# Patient Record
Sex: MALE | Race: OTHER | HISPANIC OR LATINO | Employment: UNEMPLOYED | URBAN - METROPOLITAN AREA
[De-identification: names, ages, dates, MRNs, and addresses within clinical notes are randomized per-mention and may not be internally consistent; named-entity substitution may affect disease eponyms.]

---

## 2020-02-20 PROBLEM — E66.811 CLASS 1 OBESITY DUE TO EXCESS CALORIES WITH SERIOUS COMORBIDITY AND BODY MASS INDEX (BMI) OF 32.0 TO 32.9 IN ADULT: Status: ACTIVE | Noted: 2020-02-20

## 2022-10-24 PROBLEM — E66.811 CLASS 1 OBESITY DUE TO EXCESS CALORIES WITH SERIOUS COMORBIDITY AND BODY MASS INDEX (BMI) OF 32.0 TO 32.9 IN ADULT: Status: RESOLVED | Noted: 2020-02-20 | Resolved: 2022-10-24

## 2023-10-03 ENCOUNTER — HOSPITAL ENCOUNTER (EMERGENCY)
Facility: HOSPITAL | Age: 43
Discharge: HOME | End: 2023-10-03
Attending: EMERGENCY MEDICINE
Payer: MEDICARE

## 2023-10-03 VITALS
TEMPERATURE: 97.5 F | RESPIRATION RATE: 18 BRPM | WEIGHT: 175.5 LBS | OXYGEN SATURATION: 96 % | BODY MASS INDEX: 29.2 KG/M2 | SYSTOLIC BLOOD PRESSURE: 128 MMHG | DIASTOLIC BLOOD PRESSURE: 76 MMHG | HEART RATE: 72 BPM

## 2023-10-03 DIAGNOSIS — S13.9XXA CERVICAL SPRAIN, INITIAL ENCOUNTER: Primary | ICD-10-CM

## 2023-10-03 PROCEDURE — 99284 EMERGENCY DEPT VISIT MOD MDM: CPT | Performed by: EMERGENCY MEDICINE

## 2023-10-03 PROCEDURE — 96372 THER/PROPH/DIAG INJ SC/IM: CPT

## 2023-10-03 PROCEDURE — 99282 EMERGENCY DEPT VISIT SF MDM: CPT

## 2023-10-03 RX ORDER — CYCLOBENZAPRINE HCL 10 MG
10 TABLET ORAL ONCE
Status: COMPLETED | OUTPATIENT
Start: 2023-10-03 | End: 2023-10-03

## 2023-10-03 RX ORDER — IBUPROFEN 400 MG/1
400 TABLET ORAL EVERY 6 HOURS PRN
Qty: 30 TABLET | Refills: 0 | Status: SHIPPED | OUTPATIENT
Start: 2023-10-03 | End: 2023-10-10

## 2023-10-03 RX ORDER — KETOROLAC TROMETHAMINE 30 MG/ML
15 INJECTION, SOLUTION INTRAMUSCULAR; INTRAVENOUS ONCE
Status: COMPLETED | OUTPATIENT
Start: 2023-10-03 | End: 2023-10-03

## 2023-10-03 RX ORDER — CYCLOBENZAPRINE HCL 10 MG
10 TABLET ORAL 2 TIMES DAILY PRN
Qty: 20 TABLET | Refills: 0 | Status: SHIPPED | OUTPATIENT
Start: 2023-10-03

## 2023-10-03 RX ADMIN — CYCLOBENZAPRINE HYDROCHLORIDE 10 MG: 10 TABLET, FILM COATED ORAL at 11:44

## 2023-10-03 RX ADMIN — KETOROLAC TROMETHAMINE 15 MG: 30 INJECTION, SOLUTION INTRAMUSCULAR; INTRAVENOUS at 11:44

## 2023-10-03 NOTE — ED PROVIDER NOTES
History  Chief Complaint   Patient presents with   • Shoulder Pain     Since yesterday, after going to the gym. Left side shoulder travels to neck     33-year-old male presenting the emergency department with left neck and shoulder pain. Notes that this started yesterday afternoon. Did go to the gym in the morning but was fine after the gym. No other trauma. No nausea vomiting diarrhea. No shortness of breath and no fever chills cough congestion rhinorrhea. Prior to Admission Medications   Prescriptions Last Dose Informant Patient Reported? Taking?    Blood Pressure Monitoring (BLOOD PRESSURE CUFF) MISC  Self No No   Sig: by Does not apply route daily   DULoxetine (CYMBALTA) 20 mg capsule  Self Yes No   Sig: Take 20 mg by mouth daily     E-Z JECT LANCETS MISC  Self Yes No   Sig: to use twice a day, fasting and 2 hrs after diner   Empagliflozin (Jardiance) 10 MG TABS tablet   No No   Sig: Take 1 tablet (10 mg total) by mouth every morning   atorvastatin (LIPITOR) 40 mg tablet  Self No No   Sig: Take 1 tablet (40 mg total) by mouth daily   gabapentin (NEURONTIN) 100 mg capsule  Self Yes No   Sig: Take 1 capsule by mouth 2 (two) times a day   glucose blood test strip  Self No No   Sig: Testing sugars twice a day   glucose monitoring kit (FREESTYLE) monitoring kit  Self Yes No   hydrOXYzine pamoate (VISTARIL) 25 mg capsule  Self Yes No   Sig: Take 25 mg by mouth in the morning     metFORMIN (GLUCOPHAGE) 500 mg tablet   No No   Sig: TAKE 1 TABLET BY MOUTH TWO (TWO) TIMES A DAY WITH MEALS   metoprolol succinate (TOPROL-XL) 25 mg 24 hr tablet   No No   Sig: Take 0.5 tablets (12.5 mg total) by mouth 2 (two) times a day   omega-3-acid ethyl esters (LOVAZA) 1 g capsule  Self No No   Sig: Take 2 capsules (2 g total) by mouth 2 (two) times a day   sacubitril-valsartan (Entresto) 24-26 MG TABS   No No   Sig: Take 1 tablet by mouth 2 (two) times a day   tamsulosin (FLOMAX) 0.4 mg  Self Yes No   Sig: Take 0.4 mg by mouth in the morning   triamcinolone (KENALOG) 0.5 % ointment  Self No No   Sig: Apply topically 2 (two) times a day   Patient not taking: Reported on 3/2/2022      Facility-Administered Medications: None       Past Medical History:   Diagnosis Date   • Anxiety    • Depression    • Diabetes mellitus ([address])     Type 2   • Hypertension        Past Surgical History:   Procedure Laterality Date   • NE CYSTOURETHROSCOPY N/A 6/10/2016    Procedure: Melissa Echeverria ;  Surgeon: Mere Mejía MD;  Location: [facility];  Service: Urology       Family History   Problem Relation Age of Onset   • Diabetes Father    • Hypertension Father    • Mental illness Father    • Diabetes Maternal Grandmother    • Hypertension Mother    • Heart failure Mother    • Coronary artery disease Mother    • Mental illness Mother    • Heart failure Brother    • Coronary artery disease Brother    • Hypertension Brother    • Diabetes Family      I have reviewed and agree with the history as documented. E-Cigarette/Vaping     E-Cigarette/Vaping Substances     Social History     Tobacco Use   • Smoking status: Never   • Smokeless tobacco: Never   Substance Use Topics   • Alcohol use: No   • Drug use: No       Review of Systems   Constitutional: Negative for chills and fever. HENT: Negative for ear pain and sore throat. Eyes: Negative for pain and visual disturbance. Respiratory: Negative for cough and shortness of breath. Cardiovascular: Negative for chest pain and palpitations. Gastrointestinal: Negative for abdominal pain and vomiting. Genitourinary: Negative for dysuria and hematuria. Musculoskeletal: Positive for arthralgias and neck pain. Negative for back pain. Skin: Negative for color change and rash. Neurological: Negative for seizures and syncope. All other systems reviewed and are negative. Physical Exam  Physical Exam  Vitals and nursing note reviewed.    Constitutional:       General: He is not in acute distress. Appearance: He is well-developed. HENT:      Head: Normocephalic and atraumatic. Eyes:      Conjunctiva/sclera: Conjunctivae normal.   Cardiovascular:      Rate and Rhythm: Normal rate and regular rhythm. Heart sounds: No murmur heard. Pulmonary:      Effort: Pulmonary effort is normal. No respiratory distress. Breath sounds: Normal breath sounds. Abdominal:      Palpations: Abdomen is soft. Tenderness: There is no abdominal tenderness. Musculoskeletal:         General: No swelling. Cervical back: Neck supple. Comments: No midline C-spine tenderness palpation. Left trapezius muscle ttp   Skin:     General: Skin is warm and dry. Capillary Refill: Capillary refill takes less than 2 seconds. Neurological:      Mental Status: He is alert. Psychiatric:         Mood and Affect: Mood normal.         Vital Signs  ED Triage Vitals [10/03/23 1130]   Temperature Pulse Respirations Blood Pressure SpO2   97.5 °F (36.4 °C) 72 18 128/76 96 %      Temp src Heart Rate Source Patient Position - Orthostatic VS BP Location FiO2 (%)   -- -- -- -- --      Pain Score       10 - Worst Possible Pain           Vitals:    10/03/23 1130   BP: 128/76   Pulse: 72         Visual Acuity      ED Medications  Medications   ketorolac (TORADOL) injection 15 mg (has no administration in time range)   cyclobenzaprine (FLEXERIL) tablet 10 mg (has no administration in time range)       Diagnostic Studies  Results Reviewed     None                 No orders to display              Procedures  Procedures         ED Course                               SBIRT 20yo+    Flowsheet Row Most Recent Value   Initial Alcohol Screen: US AUDIT-C     1. How often do you have a drink containing alcohol? 0 Filed at: 10/03/2023 1132   2. How many drinks containing alcohol do you have on a typical day you are drinking? 0 Filed at: 10/03/2023 1132   3a. Male UNDER 65:  How often do you have five or more drinks on one occasion? 0 Filed at: 10/03/2023 1132   3b. FEMALE Any Age, or MALE 65+: How often do you have 4 or more drinks on one occassion? 0 Filed at: 10/03/2023 1132   Audit-C Score 0 Filed at: 10/03/2023 1132   ELENO: How many times in the past year have you. .. Used an illegal drug or used a prescription medication for non-medical reasons? Never Filed at: 10/03/2023 1132                    Medical Decision Making  47-year-old male presenting to the emergency department with left neck pain. No bony tenderness to palpation. Started after exercise, likely cervical/trapezius sprain. Muscle relaxant and NSAIDs. PCP follow-up. Risk  Prescription drug management. Disposition  Final diagnoses:   Cervical sprain, initial encounter     Time reflects when diagnosis was documented in both MDM as applicable and the Disposition within this note     Time User Action Codes Description Comment    10/3/2023 11:38 AM Tejal Lee [S13. 9XXA] Cervical sprain, initial encounter       ED Disposition     ED Disposition   Discharge    Condition   Stable    Date/Time   Tue Oct 3, 2023 11:38 AM    Comment   Wes Virgen discharge to home/self care. Follow-up Information     Follow up With Specialties Details Why [facility] [address], MD Family Medicine   [address] [place] [address]  [phone]            Patient's Medications   Discharge Prescriptions    CYCLOBENZAPRINE (FLEXERIL) 10 MG TABLET    Take 1 tablet (10 mg total) by mouth 2 (two) times a day as needed for muscle spasms       Start Date: 10/3/2023 End Date: --       Order Dose: 10 mg       Quantity: 20 tablet    Refills: 0    IBUPROFEN (MOTRIN) 400 MG TABLET    Take 1 tablet (400 mg total) by mouth every 6 (six) hours as needed for mild pain (Body aches or fever) for up to 7 days       Start Date: 10/3/2023 End Date: 10/10/2023       Order Dose: 400 mg       Quantity: 30 tablet    Refills: 0 No discharge procedures on file.     PDMP Review     None          ED Provider  Electronically Signed by           Adiel Ignacio MD  10/03/23 3711 Never smoker

## 2023-10-06 DIAGNOSIS — I42.9 CARDIOMYOPATHY, UNSPECIFIED TYPE (HCC): ICD-10-CM

## 2023-10-06 RX ORDER — METOPROLOL SUCCINATE 25 MG/1
12.5 TABLET, EXTENDED RELEASE ORAL 2 TIMES DAILY
Qty: 45 TABLET | Refills: 1 | Status: SHIPPED | OUTPATIENT
Start: 2023-10-06

## 2023-10-31 ENCOUNTER — OFFICE VISIT (OUTPATIENT)
Dept: CARDIOLOGY CLINIC | Facility: CLINIC | Age: 43
End: 2023-10-31
Payer: MEDICARE

## 2023-10-31 VITALS
DIASTOLIC BLOOD PRESSURE: 80 MMHG | OXYGEN SATURATION: 98 % | HEART RATE: 78 BPM | BODY MASS INDEX: 27.99 KG/M2 | WEIGHT: 168 LBS | SYSTOLIC BLOOD PRESSURE: 120 MMHG | HEIGHT: 65 IN

## 2023-10-31 DIAGNOSIS — I50.9 CHRONIC CONGESTIVE HEART FAILURE, UNSPECIFIED HEART FAILURE TYPE (HCC): Primary | ICD-10-CM

## 2023-10-31 DIAGNOSIS — E78.2 MIXED HYPERLIPIDEMIA: ICD-10-CM

## 2023-10-31 DIAGNOSIS — Z86.79 H/O CARDIOMYOPATHY: ICD-10-CM

## 2023-10-31 DIAGNOSIS — I10 BENIGN ESSENTIAL HYPERTENSION: ICD-10-CM

## 2023-10-31 PROCEDURE — 99214 OFFICE O/P EST MOD 30 MIN: CPT | Performed by: INTERNAL MEDICINE

## 2023-10-31 NOTE — PROGRESS NOTES
Advanced Heart Failure Outpatient Progress Note - Elmyra Epley 37 y.o. male MRN: [id number]    Encounter: [id number]      Assessment/Plan:    Patient Active Problem List    Diagnosis Date Noted    Dry skin dermatitis 02/08/2021    Nephrolithiasis 10/06/2020    Mixed hyperlipidemia 07/24/2020    Microalbuminuria 07/24/2020    Bipolar affective disorder in remission ([address]) 08/16/2018    Vitamin D deficiency 07/19/2018    Benign non-nodular prostatic hyperplasia with lower urinary tract symptoms 05/03/2016    Arthritis 11/06/2014    Congestive heart failure ([address]) 11/06/2014    Depression 11/06/2014    Anxiety 11/06/2014    Sleep apnea 11/06/2014    Scoliosis (and kyphoscoliosis), idiopathic 10/02/2014    Large liver 10/02/2014    Cardiomyopathy ([address]) 05/08/2013    Benign essential hypertension 07/15/2012    Controlled diabetes mellitus type 2 with complications, unspecified whether long term insulin use ([address]) 07/15/2012       # Heart failure with improved EF, NYHA I  Etiology: Nonischemic. H/o palpitations. Mother with history of "enlarged heart" so possible familial. Has hypertension but has not been not severely elevated. Patient deferred cardiac MRI. 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 Predominant underlying rhythm was Sinus Rhythm. Isolated SVEs  were rare (<1.0%), and no SVE Couplets or SVE Triplets were present.   Isolated VEs were rare (<1.0%), and no VE Couplets or VE Triplets were  Present. Pharm Nuc Stress 12/2/21: No perfusion defects. LVEF 49%    Echocardiogram 9/7/2021  LVEF: 40%  LVIDd: 4.9cm  RV: normal size and systolic function  MR: trace  PASP: inadequate TR envelope for estimation  RVOT: no notching  Other: normal diastology, concentric LVH (IVSd 1.2 PWd 1.4)    Pharm Nuc Stress 5/13/2013: No perfusion defects. LVEF 51%  TTE 9/5/2012: LVEF 35%. Grade 2 diastology    Diet:  2 g sodium diet  2000 mL fluid restriction    Neurohormonal Blockade:  --Beta-Blocker: metoprolol succinate 12.5mg BID  --ACEi, ARB or ARNi: entresto 24-26mg BID  --Aldosterone Receptor Blocker: none  --SGLT2 Inhibitor: empagliflozin 10mg daily  --Diuretic: none    Sudden Cardiac Death Risk Reduction:  --ICD:  LVEF >35%    Electrical Resynchronization:  --Candidacy for BiV device: narrow QRS    Advanced Therapies (if appropriate): --We will continue to monitor    # DM type 2  HbA1c 5.7 4/17/23, per PCP  # SKYE, used in 2012, was 262 lbs then, no obstructive sleep apnea on sleep study 4/22/23  # Hypertension, controlled  # Mixed hyperlipidemia  8/3/21: TG 86 LDL86  1/25/22: TG 98 HDL 35   4/17/23: TG 90 HDL 46   Rx: atorvastatin 40mg daily,  lovaza 2g BID    Today's Plan:  Continues to do well from a cardiac standpoint  Continue current medications  2 g sodium diet  Physical activity/exercise as tolerated  Follow-up in 6 months or sooner as needed    HPI:   37 y.o.  with PMH as above who is for follow up.    4/26/22: Here for follow up. Overall doing fine. He continues to work out but would have intermittent episodes of shortness of breath "depending on the day", seems to be overall on the same amount of exertion. No PND or orthopnea. No leg swelling. No palpitations or chest pain. No lightheadedness. History obtained with ALTHEA Darby for translation    Per NP Miguelina: 6/23/22. Presents for follow up. [place], [place], providing assistance with translation.  At last visit, was started on Jardiance 10 mg daily. Follow up BMP stable. In the ED on 6/17 with SOB. Workup unremarkable. Feels well today but still getting occasional palpitations when at rest. He continues to go to the gym. Does weight training and cardio, which includes playing basketball. He occasionally has to stop to take a break, but overall tolerates well. 9/19/22: Here for follow up. BMP 6/17 unremarkable. Reports doing well. Walks stairs no problems. Still going to the gym and playing basketball. No issues with medications. Occasional lightheadedness with getting up.     12/14/22: Here for follow up. Seen at the ED for fever up to 103 on 11/17/22. Tested positive for COVID 19. No active cardiac issues. Goes to the gym 5 days a week 1.5-2 hours. Doing weights and cardio. Sleeps for about 7 hours but still feeling tired upon waking up    Interval History:  5/4/23: here for follow up. 4/17/23 Na 137 K 4.5 and creatinine 0.85  The patient has no current concerns and is consistently taking his prescribed medications. He maintains a gym routine, working out 5 days a week for 1 to 2 hours, including 1 hour of cardio exercise, with no reported issues. He monitors his blood pressure at home, and the average reading ranges from 120/75 mmHg to 120/80 mmHg. His glucose levels are well-maintained. He went to the emergency room due to a neck sprain, which he had incurred by stretching his trapezius muscle at home. There are no problems related to fluid retention, swelling, or bloating. He denies experiencing any difficulty in breathing when lying down. He denies dizziness, lightheadedness, or palpitations. Past Medical History:   Diagnosis Date    Anxiety     Depression     Diabetes mellitus ([address])     Type 2    Hypertension        Review of Systems   Constitutional:  Negative for chills, fatigue and fever. HENT:  Negative for ear pain and sore throat. Eyes:  Negative for pain and visual disturbance. Respiratory:  Negative for cough and shortness of breath. Cardiovascular:  Negative for chest pain, palpitations and leg swelling. Gastrointestinal:  Negative for abdominal distention, abdominal pain and vomiting. Genitourinary:  Negative for dysuria and hematuria. Musculoskeletal:  Negative for arthralgias and back pain. Skin:  Negative for color change and rash. Neurological:  Negative for seizures, syncope and light-headedness. All other systems reviewed and are negative. Allergies   Allergen Reactions    No Known Allergies      .     Current Outpatient Medications:     atorvastatin (LIPITOR) 40 mg tablet, Take 1 tablet (40 mg total) by mouth daily, Disp: 90 tablet, Rfl: 1    Blood Pressure Monitoring (BLOOD PRESSURE CUFF) MISC, by Does not apply route daily, Disp: 1 each, Rfl: 0    cyclobenzaprine (FLEXERIL) 10 mg tablet, Take 1 tablet (10 mg total) by mouth 2 (two) times a day as needed for muscle spasms, Disp: 20 tablet, Rfl: 0    DULoxetine (CYMBALTA) 20 mg capsule, Take 20 mg by mouth daily  , Disp: , Rfl:     E-Z JECT LANCETS MISC, to use twice a day, fasting and 2 hrs after diner, Disp: , Rfl:     Empagliflozin (Jardiance) 10 MG TABS tablet, Take 1 tablet (10 mg total) by mouth every morning, Disp: 90 tablet, Rfl: 2    gabapentin (NEURONTIN) 100 mg capsule, Take 1 capsule by mouth 2 (two) times a day, Disp: , Rfl:     glucose blood test strip, Testing sugars twice a day, Disp: 200 each, Rfl: 1    glucose monitoring kit (FREESTYLE) monitoring kit, , Disp: , Rfl:     hydrOXYzine pamoate (VISTARIL) 25 mg capsule, Take 25 mg by mouth in the morning  , Disp: , Rfl:     ibuprofen (MOTRIN) 400 mg tablet, Take 1 tablet (400 mg total) by mouth every 6 (six) hours as needed for mild pain (Body aches or fever) for up to 7 days, Disp: 30 tablet, Rfl: 0    metFORMIN (GLUCOPHAGE) 500 mg tablet, TAKE 1 TABLET BY MOUTH TWO (TWO) TIMES A DAY WITH MEALS, Disp: 180 tablet, Rfl: 0    metoprolol succinate (TOPROL-XL) 25 mg 24 hr tablet, TAKE 0.5 TABLETS (12.5 MG TOTAL) BY MOUTH 2 (TWO) TIMES A DAY, Disp: 45 tablet, Rfl: 1    omega-3-acid ethyl esters (LOVAZA) 1 g capsule, Take 2 capsules (2 g total) by mouth 2 (two) times a day, Disp: 360 capsule, Rfl: 5    sacubitril-valsartan (Entresto) 24-26 MG TABS, Take 1 tablet by mouth 2 (two) times a day, Disp: 180 tablet, Rfl: 2    tamsulosin (FLOMAX) 0.4 mg, Take 0.4 mg by mouth in the morning (Patient not taking: Reported on 10/31/2023), Disp: , Rfl:     triamcinolone (KENALOG) 0.5 % ointment, Apply topically 2 (two) times a day (Patient not taking: Reported on 3/2/2022), Disp: 30 g, Rfl: 0    Social History     Socioeconomic History    Marital status: /Civil Union     Spouse name: Not on file    Number of children: Not on file    Years of education: Not on file    Highest education level: Not on file   Occupational History    Not on file   Tobacco Use    Smoking status: Never    Smokeless tobacco: Never   Substance and Sexual Activity    Alcohol use: No    Drug use: No    Sexual activity: Not on file   Other Topics Concern    Not on file   Social History Narrative    Non -Smoker     No known smoke exposure     No alcohol     No illicit drug use     Seatbelt use     Exercise regularly     Emmanuel      Social Determinants of Health     Financial Resource Strain: Low Risk  (10/24/2022)    Overall Financial Resource Strain (CARDIA)     Difficulty of Paying Living Expenses: Not hard at all   Food Insecurity: No Food Insecurity (2/20/2020)    Hunger Vital Sign     Worried About Running Out of Food in the Last Year: Never true     Ran Out of Food in the Last Year: Never true   Transportation Needs: No Transportation Needs (10/24/2022)    PRAPARE - Transportation     Lack of Transportation (Medical): No     Lack of Transportation (Non-Medical):  No   Physical Activity: Sufficiently Active (2/20/2020)    Exercise Vital Sign     Days of Exercise per Week: 3 days     Minutes of Exercise per Session: 60 min   Stress: No Stress Concern Present (2/20/2020)    [facility]     Feeling of Stress : Not at all   Social Connections: Unknown (2/20/2020)    Social Connection and Isolation Panel [NHANES]     Frequency of Communication with Friends and Family: Patient refused     Frequency of Social Gatherings with Friends and Family: Patient refused     Attends [employer] Services: Patient refused     Active Member of Clubs or Organizations: Patient refused     Attends Club or Organization Meetings: Patient refused     Marital Status: Patient refused   Intimate Partner Violence: Not At Risk (2/20/2020)    Humiliation, Afraid, Rape, and Kick questionnaire     Fear of Current or Ex-Partner: No     Emotionally Abused: No     Physically Abused: No     Sexually Abused: No   Housing Stability: Not on file       Family History   Problem Relation Age of Onset    Diabetes Father     Hypertension Father     Mental illness Father     Diabetes Maternal Grandmother     Hypertension Mother     Heart failure Mother     Coronary artery disease Mother     Mental illness Mother     Heart failure Brother     Coronary artery disease Brother     Hypertension Brother     Diabetes Family        Physical Exam:    Vitals: Blood pressure 120/80, pulse 78, height 5' 5" (1.651 m), weight 76.2 kg (168 lb), SpO2 98 %. , Body mass index is 27.96 kg/m².,   Wt Readings from Last 3 Encounters:   10/31/23 76.2 kg (168 lb)   10/03/23 79.6 kg (175 lb 8 oz)   07/26/23 77.5 kg (170 lb 12.8 oz)       Physical Exam  Constitutional:       General: He is not in acute distress. Appearance: Normal appearance. HENT:      Head: Normocephalic and atraumatic. Mouth/Throat:      Mouth: Mucous membranes are moist.   Eyes:      General: No scleral icterus. Extraocular Movements: Extraocular movements intact.    Cardiovascular:      Rate and Rhythm: Normal rate and regular rhythm. Pulses: Normal pulses. Heart sounds: S1 normal and S2 normal. No murmur heard. No friction rub. No gallop. Comments: Nonelevated JVP  Pulmonary:      Effort: Pulmonary effort is normal.      Breath sounds: Normal breath sounds. Abdominal:      General: There is no distension. Palpations: Abdomen is soft. Tenderness: There is no abdominal tenderness. There is no guarding or rebound. Musculoskeletal:         General: Normal range of motion. Cervical back: Neck supple. Right lower leg: No edema. Left lower leg: No edema. Skin:     General: Skin is warm and dry. Capillary Refill: Capillary refill takes less than 2 seconds. Neurological:      General: No focal deficit present. Mental Status: He is alert and oriented to person, place, and time. Psychiatric:         Mood and Affect: Mood normal.     Results  His HbA1c in 04/2023 was 5.7 percent. Labs & Results:    Lab Results   Component Value Date    WBC 5.65 02/08/2023    HGB 15.3 02/08/2023    HCT 48.7 02/08/2023    MCV 88 02/08/2023     02/08/2023     Lab Results   Component Value Date    SODIUM 137 04/17/2023    K 4.5 04/17/2023     (H) 04/17/2023    CO2 26 04/17/2023    BUN 13 04/17/2023    CREATININE 0.85 04/17/2023    GLUC 125 06/17/2022    CALCIUM 9.3 04/17/2023     Lab Results   Component Value Date    NTBNP 30 06/17/2022      Lab Results   Component Value Date    CHOLESTEROL 165 04/17/2023    CHOLESTEROL 97 02/08/2023    CHOLESTEROL 94 09/21/2022     Lab Results   Component Value Date    HDL 46 04/17/2023    HDL 40 02/08/2023    HDL 42 09/21/2022     Lab Results   Component Value Date    TRIG 90 04/17/2023    TRIG 47 02/08/2023    TRIG 69 09/21/2022     Lab Results   Component Value Date    NONHDLC 119 04/17/2023    [address] 57 02/08/2023    [address] 52 09/21/2022       EKG personally reviewed by Alfredo Cunningham.      Counseling / Coordination of Care  Time spent today 25 minutes. Greater than 50% of total time was spent with the patient and / or family counseling and / or coordination of care. We discussed diagnoses, most recent studies and any changes in treatment    Thank you for the opportunity to participate in the care of this patient. Arlyn Caban MD  ADVANCED HEART FAILURE AND MECHANICAL CIRCULATORY SUPPORT  [address]    Transcribed by Terell Ramirez on 10/31/2023 at 5:55 pm. Powered by [employer].

## 2023-11-18 DIAGNOSIS — E78.2 MIXED HYPERLIPIDEMIA: ICD-10-CM

## 2023-11-18 DIAGNOSIS — E11.9 TYPE 2 DIABETES MELLITUS WITHOUT COMPLICATION, WITHOUT LONG-TERM CURRENT USE OF INSULIN (HCC): ICD-10-CM

## 2023-11-20 RX ORDER — ATORVASTATIN CALCIUM 40 MG/1
40 TABLET, FILM COATED ORAL DAILY
Qty: 90 TABLET | Refills: 0 | Status: SHIPPED | OUTPATIENT
Start: 2023-11-20

## 2023-11-20 RX ORDER — OMEGA-3-ACID ETHYL ESTERS 1 G/1
2 CAPSULE, LIQUID FILLED ORAL 2 TIMES DAILY
Qty: 360 CAPSULE | Refills: 4 | Status: SHIPPED | OUTPATIENT
Start: 2023-11-20

## 2024-01-06 DIAGNOSIS — I42.9 CARDIOMYOPATHY, UNSPECIFIED TYPE (HCC): ICD-10-CM

## 2024-01-08 RX ORDER — METOPROLOL SUCCINATE 25 MG/1
12.5 TABLET, EXTENDED RELEASE ORAL 2 TIMES DAILY
Qty: 45 TABLET | Refills: 1 | Status: SHIPPED | OUTPATIENT
Start: 2024-01-08

## 2024-02-12 ENCOUNTER — OFFICE VISIT (OUTPATIENT)
Dept: CARDIOLOGY CLINIC | Facility: CLINIC | Age: 44
End: 2024-02-12
Payer: MEDICARE

## 2024-02-12 VITALS
BODY MASS INDEX: 30.49 KG/M2 | DIASTOLIC BLOOD PRESSURE: 70 MMHG | HEIGHT: 65 IN | SYSTOLIC BLOOD PRESSURE: 108 MMHG | WEIGHT: 183 LBS | HEART RATE: 81 BPM | OXYGEN SATURATION: 97 %

## 2024-02-12 DIAGNOSIS — I42.9 CARDIOMYOPATHY, UNSPECIFIED TYPE (HCC): ICD-10-CM

## 2024-02-12 DIAGNOSIS — E78.2 MIXED HYPERLIPIDEMIA: ICD-10-CM

## 2024-02-12 DIAGNOSIS — I10 BENIGN ESSENTIAL HYPERTENSION: ICD-10-CM

## 2024-02-12 DIAGNOSIS — E11.8 CONTROLLED DIABETES MELLITUS TYPE 2 WITH COMPLICATIONS, UNSPECIFIED WHETHER LONG TERM INSULIN USE (HCC): ICD-10-CM

## 2024-02-12 DIAGNOSIS — I50.22 HEART FAILURE WITH IMPROVED EJECTION FRACTION (HFIMPEF) (HCC): Primary | ICD-10-CM

## 2024-02-12 DIAGNOSIS — I50.9 CHRONIC CONGESTIVE HEART FAILURE, UNSPECIFIED HEART FAILURE TYPE (HCC): ICD-10-CM

## 2024-02-12 PROCEDURE — 99214 OFFICE O/P EST MOD 30 MIN: CPT | Performed by: INTERNAL MEDICINE

## 2024-02-12 RX ORDER — SACUBITRIL AND VALSARTAN 24; 26 MG/1; MG/1
1 TABLET, FILM COATED ORAL 2 TIMES DAILY
Qty: 180 TABLET | Refills: 3 | Status: SHIPPED | OUTPATIENT
Start: 2024-02-12

## 2024-02-12 RX ORDER — ATORVASTATIN CALCIUM 40 MG/1
40 TABLET, FILM COATED ORAL DAILY
Qty: 90 TABLET | Refills: 3 | Status: SHIPPED | OUTPATIENT
Start: 2024-02-12

## 2024-02-12 RX ORDER — METOPROLOL SUCCINATE 25 MG/1
12.5 TABLET, EXTENDED RELEASE ORAL 2 TIMES DAILY
Qty: 45 TABLET | Refills: 3 | Status: SHIPPED | OUTPATIENT
Start: 2024-02-12

## 2024-02-12 NOTE — PATIENT INSTRUCTIONS
Continue current cardiac medications  Continue current medications  2 g sodium diet  Physical activity/exercise as tolerated

## 2024-02-12 NOTE — PROGRESS NOTES
"Advanced Heart Failure Outpatient Progress Note - Wes Virgen 43 y.o. male MRN: [id number]    Encounter: [id number]      Assessment/Plan:    Patient Active Problem List    Diagnosis Date Noted    Dry skin dermatitis 02/08/2021    Nephrolithiasis 10/06/2020    Mixed hyperlipidemia 07/24/2020    Microalbuminuria 07/24/2020    Bipolar affective disorder in remission (HCC) 08/16/2018    Vitamin D deficiency 07/19/2018    Benign non-nodular prostatic hyperplasia with lower urinary tract symptoms 05/03/2016    Arthritis 11/06/2014    Congestive heart failure (HCC) 11/06/2014    Depression 11/06/2014    Anxiety 11/06/2014    Sleep apnea 11/06/2014    Scoliosis (and kyphoscoliosis), idiopathic 10/02/2014    Large liver 10/02/2014    Cardiomyopathy ([facility]) 05/08/2013    Benign essential hypertension 07/15/2012    Controlled diabetes mellitus type 2 with complications, unspecified whether long term insulin use ([facility]) 07/15/2012       # Heart failure with improved EF, NYHA I  Etiology: Nonischemic. H/o palpitations. Mother with history of \"enlarged heart\" so possible familial. Has hypertension but has not been not severely elevated. Patient deferred cardiac MRI.  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 Predominant underlying rhythm was Sinus Rhythm. Isolated SVEs  were rare (<1.0%), and no SVE Couplets or SVE Triplets were present.  Isolated VEs were rare (<1.0%), and no VE Couplets or VE Triplets were  Present.    Pharm Nuc Stress 12/2/21: No " "perfusion defects. LVEF 49%    Echocardiogram 9/7/2021  LVEF: 40%  LVIDd: 4.9cm  RV: normal size and systolic function  MR: trace  PASP: inadequate TR envelope for estimation  RVOT: no notching  Other: normal diastology, concentric LVH (IVSd 1.2 PWd 1.4)    Pharm Nuc Stress 5/13/2013: No perfusion defects. LVEF 51%  TTE 9/5/2012: LVEF 35%. Grade 2 diastology    Diet:  2 g sodium diet  2000 mL fluid restriction    Neurohormonal Blockade:  --Beta-Blocker: metoprolol succinate 12.5mg BID  --ACEi, ARB or ARNi: entresto 24-26mg BID  --Aldosterone Receptor Blocker: none  --SGLT2 Inhibitor: empagliflozin 10mg daily  --Diuretic: none    Sudden Cardiac Death Risk Reduction:  --ICD:  LVEF >35%    Electrical Resynchronization:  --Candidacy for BiV device: narrow QRS    Advanced Therapies (if appropriate):  --We will continue to monitor    # DM type 2  HbA1c 5.7 4/17/23, per PCP  # SKYE, used in 2012, was 262 lbs then, no obstructive sleep apnea on sleep study 4/22/23  # Hypertension, controlled  # Mixed hyperlipidemia  8/3/21: TG 86 LDL86  1/25/22: TG 98 HDL 35   4/17/23: TG 90 HDL 46   Rx: atorvastatin 40mg daily,  lovaza 2g BID    Today's Plan:  Doing well from a cardiac standpoint  Continue current medications  2 g sodium diet  Physical activity/exercise as tolerated  Routine labs as ordered per PCP      HPI:   43 y.o.  with PMH as above who is for follow up.    4/26/22: Here for follow up. Overall doing fine. He continues to work out but would have intermittent episodes of shortness of breath \"depending on the day\", seems to be overall on the same amount of exertion. No PND or orthopnea. No leg swelling. No palpitations or chest pain. No lightheadedness. History obtained with ALTHEA Darby for translation    Per FARRAH Mcintyre: 6/23/22. Presents for follow up. ALTHEA Velarde, providing assistance with translation. At last visit, was started on Jardiance 10 mg daily. Follow up BMP stable.  In the ED on 6/17 with SOB. " Workup unremarkable. Feels well today but still getting occasional palpitations when at rest. He continues to go to the gym. Does weight training and cardio, which includes playing basketball. He occasionally has to stop to take a break, but overall tolerates well.     9/19/22: Here for follow up. BMP 6/17 unremarkable. Reports doing well. Walks stairs no problems. Still going to the gym and playing basketball. No issues with medications. Occasional lightheadedness with getting up.     12/14/22: Here for follow up. Seen at the ED for fever up to 103 on 11/17/22. Tested positive for COVID 19. No active cardiac issues. Goes to the gym 5 days a week 1.5-2 hours. Doing weights and cardio. Sleeps for about 7 hours but still feeling tired upon waking up    5/4/23: here for follow up. 4/17/23 Na 137 K 4.5 and creatinine 0.85  The patient has no current concerns and is consistently taking his prescribed medications.  He maintains a gym routine, working out 5 days a week for 1 to 2 hours, including 1 hour of cardio exercise, with no reported issues.  He monitors his blood pressure at home, and the average reading ranges from 120/75 mmHg to 120/80 mmHg.  His glucose levels are well-maintained.  He went to the emergency room due to a neck sprain, which he had incurred by stretching his trapezius muscle at home.  There are no problems related to fluid retention, swelling, or bloating.  He denies experiencing any difficulty in breathing when lying down.  He denies dizziness, lightheadedness, or palpitations.    Interval History:  2/12/24: Here for follow up. Weight up on office visit. Correlating on home scale, 178 lbs on home scale. No shortness of breath, leg swelling, abdominal distension. No PND or orthopnea. Taking medications as prescribed. Continues with regular exercise as above with no perceived limitation.       Past Medical History:   Diagnosis Date    Anxiety     Depression     Diabetes mellitus (HCC)     Type 2     Hypertension        Review of Systems   Constitutional:  Negative for chills, fatigue and fever.   HENT:  Negative for ear pain and sore throat.    Eyes:  Negative for pain and visual disturbance.   Respiratory:  Negative for cough and shortness of breath.    Cardiovascular:  Negative for chest pain, palpitations and leg swelling.   Gastrointestinal:  Negative for abdominal distention, abdominal pain and vomiting.   Genitourinary:  Negative for dysuria and hematuria.   Musculoskeletal:  Negative for arthralgias and back pain.   Skin:  Negative for color change and rash.   Neurological:  Negative for dizziness, seizures, syncope and light-headedness.   All other systems reviewed and are negative.       Allergies   Allergen Reactions    No Known Allergies      .    Current Outpatient Medications:     atorvastatin (LIPITOR) 40 mg tablet, Take 1 tablet (40 mg total) by mouth daily, Disp: 90 tablet, Rfl: 3    cyclobenzaprine (FLEXERIL) 10 mg tablet, Take 1 tablet (10 mg total) by mouth 2 (two) times a day as needed for muscle spasms, Disp: 20 tablet, Rfl: 0    DULoxetine (CYMBALTA) 20 mg capsule, Take 20 mg by mouth daily  , Disp: , Rfl:     Empagliflozin (Jardiance) 10 MG TABS tablet, Take 1 tablet (10 mg total) by mouth every morning, Disp: 90 tablet, Rfl: 3    gabapentin (NEURONTIN) 100 mg capsule, Take 1 capsule by mouth 3 (three) times a day, Disp: , Rfl:     hydrOXYzine pamoate (VISTARIL) 25 mg capsule, Take 25 mg by mouth in the morning  , Disp: , Rfl:     metFORMIN (GLUCOPHAGE) 500 mg tablet, TAKE 1 TABLET BY MOUTH TWO (TWO) TIMES A DAY WITH MEALS, Disp: 180 tablet, Rfl: 0    metoprolol succinate (TOPROL-XL) 25 mg 24 hr tablet, Take 0.5 tablets (12.5 mg total) by mouth 2 (two) times a day, Disp: 45 tablet, Rfl: 3    omega-3-acid ethyl esters (LOVAZA) 1 g capsule, Take 2 capsules (2 g total) by mouth 2 (two) times a day, Disp: 360 capsule, Rfl: 4    sacubitril-valsartan (Entresto) 24-26 MG TABS, Take 1 tablet by  mouth 2 (two) times a day, Disp: 180 tablet, Rfl: 3    Blood Pressure Monitoring (BLOOD PRESSURE CUFF) MISC, by Does not apply route daily, Disp: 1 each, Rfl: 0    E-Z JECT LANCETS MISC, to use twice a day, fasting and 2 hrs after diner, Disp: , Rfl:     glucose blood test strip, Testing sugars twice a day, Disp: 200 each, Rfl: 1    glucose monitoring kit (FREESTYLE) monitoring kit, , Disp: , Rfl:     ibuprofen (MOTRIN) 400 mg tablet, Take 1 tablet (400 mg total) by mouth every 6 (six) hours as needed for mild pain (Body aches or fever) for up to 7 days, Disp: 30 tablet, Rfl: 0    tamsulosin (FLOMAX) 0.4 mg, Take 0.4 mg by mouth in the morning (Patient not taking: Reported on 10/31/2023), Disp: , Rfl:     triamcinolone (KENALOG) 0.5 % ointment, Apply topically 2 (two) times a day (Patient not taking: Reported on 3/2/2022), Disp: 30 g, Rfl: 0    Social History     Socioeconomic History    Marital status: /Civil Union     Spouse name: Not on file    Number of children: Not on file    Years of education: Not on file    Highest education level: Not on file   Occupational History    Not on file   Tobacco Use    Smoking status: Never    Smokeless tobacco: Never   Substance and Sexual Activity    Alcohol use: No    Drug use: No    Sexual activity: Not on file   Other Topics Concern    Not on file   Social History Narrative    Non -Smoker     No known smoke exposure     No alcohol     No illicit drug use     Seatbelt use     Exercise regularly     Hinduism      Social Determinants of Health     Financial Resource Strain: Low Risk  (10/24/2022)    Overall Financial Resource Strain (CARDIA)     Difficulty of Paying Living Expenses: Not hard at all   Food Insecurity: No Food Insecurity (2/20/2020)    Hunger Vital Sign     Worried About Running Out of Food in the Last Year: Never true     Ran Out of Food in the Last Year: Never true   Transportation Needs: No Transportation Needs (10/24/2022)    PRAPARE - Transportation  "    Lack of Transportation (Medical): No     Lack of Transportation (Non-Medical): No   Physical Activity: Sufficiently Active (2/20/2020)    Exercise Vital Sign     Days of Exercise per Week: 3 days     Minutes of Exercise per Session: 60 min   Stress: No Stress Concern Present (2/20/2020)    [employer] [place] [employer] - Occupational Stress Questionnaire     Feeling of Stress : Not at all   Social Connections: Unknown (2/20/2020)    Social Connection and Isolation Panel [NHANES]     Frequency of Communication with Friends and Family: Patient declined     Frequency of Social Gatherings with Friends and Family: Patient declined     Attends [employer] Services: Patient declined     Active Member of Clubs or Organizations: Patient declined     Attends Club or Organization Meetings: Patient declined     Marital Status: Patient declined   Intimate Partner Violence: Not At Risk (2/20/2020)    Humiliation, Afraid, Rape, and Kick questionnaire     Fear of Current or Ex-Partner: No     Emotionally Abused: No     Physically Abused: No     Sexually Abused: No   Housing Stability: Not on file       Family History   Problem Relation Age of Onset    Diabetes Father     Hypertension Father     Mental illness Father     Diabetes Maternal Grandmother     Hypertension Mother     Heart failure Mother     Coronary artery disease Mother     Mental illness Mother     Heart failure Brother     Coronary artery disease Brother     Hypertension Brother     Diabetes Family        Physical Exam:    Vitals: Blood pressure 108/70, pulse 81, height 5' 5\" (1.651 m), weight 83 kg (183 lb), SpO2 97%., Body mass index is 30.45 kg/m².,   Wt Readings from Last 3 Encounters:   02/12/24 83 kg (183 lb)   10/31/23 76.2 kg (168 lb)   10/03/23 79.6 kg (175 lb 8 oz)       Physical Exam  Constitutional:       General: He is not in acute distress.     Appearance: Normal appearance.   HENT:      Head: Normocephalic and atraumatic.      Mouth/Throat: "      Mouth: Mucous membranes are moist.   Eyes:      General: No scleral icterus.     Extraocular Movements: Extraocular movements intact.   Cardiovascular:      Rate and Rhythm: Normal rate and regular rhythm.      Pulses: Normal pulses.      Heart sounds: S1 normal and S2 normal. No murmur heard.     No friction rub. No gallop.      Comments: Nonelevated JVP  Pulmonary:      Effort: Pulmonary effort is normal.      Breath sounds: Normal breath sounds.   Abdominal:      General: There is no distension.      Palpations: Abdomen is soft.      Tenderness: There is no abdominal tenderness. There is no guarding or rebound.   Musculoskeletal:         General: Normal range of motion.      Cervical back: Neck supple.      Right lower leg: No edema.      Left lower leg: No edema.   Skin:     General: Skin is warm and dry.      Capillary Refill: Capillary refill takes less than 2 seconds.   Neurological:      General: No focal deficit present.      Mental Status: He is alert and oriented to person, place, and time.   Psychiatric:         Mood and Affect: Mood normal.       Results  His HbA1c in 04/2023 was 5.7 percent.    Labs & Results:    Lab Results   Component Value Date    WBC 5.65 02/08/2023    HGB 15.3 02/08/2023    HCT 48.7 02/08/2023    MCV 88 02/08/2023     02/08/2023     Lab Results   Component Value Date    SODIUM 137 04/17/2023    K 4.5 04/17/2023     (H) 04/17/2023    CO2 26 04/17/2023    BUN 13 04/17/2023    CREATININE 0.85 04/17/2023    GLUC 125 06/17/2022    CALCIUM 9.3 04/17/2023     Lab Results   Component Value Date    NTBNP 30 06/17/2022      Lab Results   Component Value Date    CHOLESTEROL 165 04/17/2023    CHOLESTEROL 97 02/08/2023    CHOLESTEROL 94 09/21/2022     Lab Results   Component Value Date    HDL 46 04/17/2023    HDL 40 02/08/2023    HDL 42 09/21/2022     Lab Results   Component Value Date    TRIG 90 04/17/2023    TRIG 47 02/08/2023    TRIG 69 09/21/2022     Lab Results    Component Value Date    NONHDLC 119 04/17/2023    NONHDLC 57 02/08/2023    NONHDLC 52 09/21/2022       EKG personally reviewed by Jass Lim.     Counseling / Coordination of Care  Time spent today 25 minutes.  Greater than 50% of total time was spent with the patient and / or family counseling and / or coordination of care.  We discussed diagnoses, most recent studies and any changes in treatment    Thank you for the opportunity to participate in the care of this patient.    JASS LIM MD  ADVANCED HEART FAILURE AND MECHANICAL CIRCULATORY SUPPORT  [facility]

## 2024-02-17 DIAGNOSIS — E11.9 TYPE 2 DIABETES MELLITUS WITHOUT COMPLICATION, WITHOUT LONG-TERM CURRENT USE OF INSULIN (HCC): ICD-10-CM

## 2024-03-18 ENCOUNTER — APPOINTMENT (OUTPATIENT)
Dept: LAB | Facility: CLINIC | Age: 44
End: 2024-03-18
Payer: MEDICARE

## 2024-03-18 DIAGNOSIS — E78.2 MIXED HYPERLIPIDEMIA: ICD-10-CM

## 2024-03-18 DIAGNOSIS — E11.8 CONTROLLED DIABETES MELLITUS TYPE 2 WITH COMPLICATIONS, UNSPECIFIED WHETHER LONG TERM INSULIN USE (HCC): ICD-10-CM

## 2024-03-18 DIAGNOSIS — I10 BENIGN ESSENTIAL HYPERTENSION: ICD-10-CM

## 2024-03-18 LAB
ANION GAP SERPL CALCULATED.3IONS-SCNC: 6 MMOL/L (ref 4–13)
BUN SERPL-MCNC: 14 MG/DL (ref 5–25)
CALCIUM SERPL-MCNC: 8.5 MG/DL (ref 8.4–10.2)
CHLORIDE SERPL-SCNC: 107 MMOL/L (ref 96–108)
CHOLEST SERPL-MCNC: 86 MG/DL
CO2 SERPL-SCNC: 25 MMOL/L (ref 21–32)
CREAT SERPL-MCNC: 0.83 MG/DL (ref 0.6–1.3)
CREAT UR-MCNC: 170.6 MG/DL
EST. AVERAGE GLUCOSE BLD GHB EST-MCNC: 128 MG/DL
GFR SERPL CREATININE-BSD FRML MDRD: 106 ML/MIN/1.73SQ M
GLUCOSE P FAST SERPL-MCNC: 121 MG/DL (ref 65–99)
HBA1C MFR BLD: 6.1 %
HDLC SERPL-MCNC: 32 MG/DL
LDLC SERPL CALC-MCNC: 40 MG/DL (ref 0–100)
MICROALBUMIN UR-MCNC: 19.7 MG/L
MICROALBUMIN/CREAT 24H UR: 12 MG/G CREATININE (ref 0–30)
NONHDLC SERPL-MCNC: 54 MG/DL
POTASSIUM SERPL-SCNC: 4.8 MMOL/L (ref 3.5–5.3)
SODIUM SERPL-SCNC: 138 MMOL/L (ref 135–147)
TRIGL SERPL-MCNC: 69 MG/DL

## 2024-03-18 PROCEDURE — 80061 LIPID PANEL: CPT

## 2024-03-18 PROCEDURE — 80048 BASIC METABOLIC PNL TOTAL CA: CPT

## 2024-03-18 PROCEDURE — 83036 HEMOGLOBIN GLYCOSYLATED A1C: CPT

## 2024-03-18 PROCEDURE — 36415 COLL VENOUS BLD VENIPUNCTURE: CPT

## 2024-03-18 PROCEDURE — 82043 UR ALBUMIN QUANTITATIVE: CPT

## 2024-03-18 PROCEDURE — 82570 ASSAY OF URINE CREATININE: CPT

## 2024-05-28 ENCOUNTER — RA CDI HCC (OUTPATIENT)
Dept: OTHER | Facility: HOSPITAL | Age: 44
End: 2024-05-28

## 2024-06-05 ENCOUNTER — OFFICE VISIT (OUTPATIENT)
Dept: FAMILY MEDICINE CLINIC | Facility: CLINIC | Age: 44
End: 2024-06-05
Payer: MEDICARE

## 2024-06-05 VITALS
DIASTOLIC BLOOD PRESSURE: 58 MMHG | HEIGHT: 65 IN | OXYGEN SATURATION: 98 % | WEIGHT: 183 LBS | BODY MASS INDEX: 30.49 KG/M2 | TEMPERATURE: 97.8 F | RESPIRATION RATE: 16 BRPM | SYSTOLIC BLOOD PRESSURE: 102 MMHG | HEART RATE: 86 BPM

## 2024-06-05 DIAGNOSIS — F41.9 ANXIETY: ICD-10-CM

## 2024-06-05 DIAGNOSIS — I10 BENIGN ESSENTIAL HYPERTENSION: ICD-10-CM

## 2024-06-05 DIAGNOSIS — Z11.4 SCREENING FOR HIV (HUMAN IMMUNODEFICIENCY VIRUS): ICD-10-CM

## 2024-06-05 DIAGNOSIS — I50.9 CONGESTIVE HEART FAILURE, UNSPECIFIED HF CHRONICITY, UNSPECIFIED HEART FAILURE TYPE (HCC): ICD-10-CM

## 2024-06-05 DIAGNOSIS — E11.8 CONTROLLED DIABETES MELLITUS TYPE 2 WITH COMPLICATIONS, UNSPECIFIED WHETHER LONG TERM INSULIN USE (HCC): ICD-10-CM

## 2024-06-05 DIAGNOSIS — E55.9 VITAMIN D DEFICIENCY: ICD-10-CM

## 2024-06-05 DIAGNOSIS — Z12.11 SCREEN FOR COLON CANCER: ICD-10-CM

## 2024-06-05 DIAGNOSIS — E66.09 CLASS 1 OBESITY DUE TO EXCESS CALORIES WITHOUT SERIOUS COMORBIDITY WITH BODY MASS INDEX (BMI) OF 30.0 TO 30.9 IN ADULT: ICD-10-CM

## 2024-06-05 DIAGNOSIS — I42.9 CARDIOMYOPATHY, UNSPECIFIED TYPE (HCC): ICD-10-CM

## 2024-06-05 DIAGNOSIS — R35.1 NOCTURIA: ICD-10-CM

## 2024-06-05 DIAGNOSIS — E78.2 MIXED HYPERLIPIDEMIA: ICD-10-CM

## 2024-06-05 DIAGNOSIS — F32.5 MAJOR DEPRESSIVE DISORDER IN REMISSION, UNSPECIFIED WHETHER RECURRENT (HCC): ICD-10-CM

## 2024-06-05 DIAGNOSIS — Z00.00 MEDICARE ANNUAL WELLNESS VISIT, SUBSEQUENT: Primary | ICD-10-CM

## 2024-06-05 PROCEDURE — 99214 OFFICE O/P EST MOD 30 MIN: CPT | Performed by: FAMILY MEDICINE

## 2024-06-05 PROCEDURE — G0439 PPPS, SUBSEQ VISIT: HCPCS | Performed by: FAMILY MEDICINE

## 2024-06-05 RX ORDER — SACUBITRIL AND VALSARTAN 24; 26 MG/1; MG/1
1 TABLET, FILM COATED ORAL 2 TIMES DAILY
Qty: 180 TABLET | Refills: 3 | Status: SHIPPED | OUTPATIENT
Start: 2024-06-05

## 2024-06-05 RX ORDER — METOPROLOL SUCCINATE 25 MG/1
12.5 TABLET, EXTENDED RELEASE ORAL 2 TIMES DAILY
Qty: 45 TABLET | Refills: 3 | Status: SHIPPED | OUTPATIENT
Start: 2024-06-05

## 2024-06-05 RX ORDER — OMEGA-3-ACID ETHYL ESTERS 1 G/1
2 CAPSULE, LIQUID FILLED ORAL 2 TIMES DAILY
Qty: 360 CAPSULE | Refills: 3 | Status: SHIPPED | OUTPATIENT
Start: 2024-06-05

## 2024-06-05 RX ORDER — ATORVASTATIN CALCIUM 40 MG/1
40 TABLET, FILM COATED ORAL DAILY
Qty: 90 TABLET | Refills: 3 | Status: SHIPPED | OUTPATIENT
Start: 2024-06-05

## 2024-06-05 NOTE — PROGRESS NOTES
Assessment and Plan:     Problem List Items Addressed This Visit     Benign essential hypertension    Relevant Medications    metoprolol succinate (TOPROL-XL) 25 mg 24 hr tablet    Other Relevant Orders    Comprehensive metabolic panel    CBC and differential    TSH, 3rd generation with Free T4 reflex    UA w Reflex to Microscopic w Reflex to Culture    Cardiomyopathy (HCC)    Relevant Medications    Empagliflozin (Jardiance) 10 MG TABS tablet    metoprolol succinate (TOPROL-XL) 25 mg 24 hr tablet    sacubitril-valsartan (Entresto) 24-26 MG TABS    Congestive heart failure (HCC)    Relevant Medications    metoprolol succinate (TOPROL-XL) 25 mg 24 hr tablet    sacubitril-valsartan (Entresto) 24-26 MG TABS    Other Relevant Orders    Comprehensive metabolic panel    TSH, 3rd generation with Free T4 reflex    B-Type Natriuretic Peptide(BNP)    Ambulatory Referral to Cardiology    Depression    Relevant Orders    TSH, 3rd generation with Free T4 reflex    Anxiety    Relevant Orders    TSH, 3rd generation with Free T4 reflex    Controlled diabetes mellitus type 2 with complications, unspecified whether long term insulin use (HCC)    Relevant Medications    Empagliflozin (Jardiance) 10 MG TABS tablet    metFORMIN (GLUCOPHAGE) 500 mg tablet    Other Relevant Orders    Hemoglobin A1C    Comprehensive metabolic panel    Albumin / creatinine urine ratio    TSH, 3rd generation with Free T4 reflex    US abdominal aorta screening aaa    Vitamin D deficiency    Class 1 obesity due to excess calories without serious comorbidity with body mass index (BMI) of 30.0 to 30.9 in adult    Mixed hyperlipidemia    Relevant Medications    atorvastatin (LIPITOR) 40 mg tablet    omega-3-acid ethyl esters (LOVAZA) 1 g capsule    Other Relevant Orders    Comprehensive metabolic panel    Lipid Panel with Direct LDL reflex   Other Visit Diagnoses     Medicare annual wellness visit, subsequent    -  Primary    Relevant Orders    Comprehensive  metabolic panel    CBC and differential    UA w Reflex to Microscopic w Reflex to Culture    Nocturia        Relevant Orders    US kidney and bladder with pvr    PSA, total and free    UA w Reflex to Microscopic w Reflex to Culture    Screen for colon cancer        Relevant Orders    Ambulatory Referral to Gastroenterology    Screening for HIV (human immunodeficiency virus)        Relevant Orders    HIV 1/2 AG/AB w Reflex [facility] for 2 yr old and above             Regarding his Medicare annual well visit patient is given age and diagnosis appropriate evaluation and care.  Patient is ordered the above blood work and urine which also includes a screening test for HIV and a PSA as discussed with patient and with his permission.  Discussed with patient results of the PCV 20 vaccine for him as well as the Tdap patient said he will get back to me and his next visit with me in 2 months.  Patient advised diet and exercise.  Hydration.  Multivitamins.  And patient sent to GI for a colonoscopy.  Regarding his diabetes, patient advised to have less starches and sweets and fats.  Lose weight with diet and exercise.  His last A1c was 6.1 about almost 3 months ago.  Patient will recheck his labs again a week before I see him in 2 months.  Patient is up-to-date with his ophthalmologist.  Patient also sent for AAA screen.  Regarding his hypertension patient blood pressure is okay.  Patient advised to have less salt less condiments.  Lose weight with better diet and exercise.  Hydrate well.  Continue his current regimen.  Check the labs above.  Regarding his history of CHF and cardiomyopathy, discussed with patient.  Patient without acute symptoms.  Patient meds were refilled.  Patient will get the above blood work and urine.  And patient is referred to his cardiologist.  Regarding his hyperlipidemia patient's last FLP was within normal limits.  Will recheck that again with his next blood work.  Continue his current therapy.  Patient  advised to have less starches fats and sweets.  Lose weight with diet and exercise.  Regarding his depression and anxiety, discussed with patient.  Patient is stable on his current regimen.  And patient sees psychiatry as well as a therapist on regular basis.  Patient denies SI HI.  Regarding his vitamin D deficiency patient will supplement.  Check his lab value also with his next blood work.  Regarding his nocturia patient will check a PSA and urinalysis as well as renal bladder sonogram with PVR.  RTO 2 months and do the blood work and urine before.            Preventive health issues were discussed with patient, and age appropriate screening tests were ordered as noted in patient's After Visit Summary.  Personalized health advice and appropriate referrals for health education or preventive services given if needed, as noted in patient's After Visit Summary.     History of Present Illness:     Patient presents for a Medicare Wellness Visit    43-year-old male here for his annual Medicare well visit.  Patient is accompanied by his wife.  Patient also would like to be evaluated for his diabetes hypertension and hyperlipidemia.  Patient asserts that he has Medicare secondary to his disability from his anxiety.  Patient sees psychiatry and also has a therapist.  Patient denies SI HI.  Patient is also followed up and treated by cardiology for his CHF and cardiomyopathy history.  Patient denies tobacco.  Patient has not had a colonoscopy yet.  Patient also has not had a Tdap or PCV 20.  Patient has seen the ophthalmologist.  And patient is requesting refills of all his meds.       Patient Care Team:  Darshan Burgess MD as PCP - General (Family Medicine)  Ej Ortega III, DO     Review of Systems:     Review of Systems   Constitutional:  Negative for activity change, appetite change, chills, fatigue, fever and unexpected weight change.   HENT:  Negative for congestion, hearing loss and sore throat.     Eyes:  Negative for visual disturbance.   Respiratory:  Negative for cough and shortness of breath.    Cardiovascular:  Negative for chest pain and palpitations.   Gastrointestinal:  Negative for abdominal pain, blood in stool, constipation, diarrhea, nausea and vomiting.   Genitourinary:  Negative for dysuria and hematuria.   Musculoskeletal:  Negative for arthralgias.   Skin:  Negative for rash.   Neurological:  Negative for dizziness and headaches.   Psychiatric/Behavioral:  Positive for dysphoric mood. Negative for self-injury and suicidal ideas. The patient is nervous/anxious.       Problem List:     Patient Active Problem List   Diagnosis   • Arthritis   • Benign essential hypertension   • Cardiomyopathy (HCC)   • Benign non-nodular prostatic hyperplasia with lower urinary tract symptoms   • Congestive heart failure (HCC)   • Depression   • Anxiety   • Controlled diabetes mellitus type 2 with complications, unspecified whether long term insulin use (HCC)   • Scoliosis (and kyphoscoliosis), idiopathic   • Large liver   • Sleep apnea   • Vitamin D deficiency   • Bipolar affective disorder in remission (HCC)   • Class 1 obesity due to excess calories without serious comorbidity with body mass index (BMI) of 30.0 to 30.9 in adult   • Mixed hyperlipidemia   • Microalbuminuria   • Nephrolithiasis   • Dry skin dermatitis      Past Medical and Surgical History:     Past Medical History:   Diagnosis Date   • Anxiety    • Depression    • Diabetes mellitus (HCC)     Type 2   • Hypertension      Past Surgical History:   Procedure Laterality Date   • AR CYSTOURETHROSCOPY N/A 6/10/2016    Procedure: FLEXIBLE CYSTOSCOPY ;  Surgeon: Kirit Tierney MD;  Location: [facility];  Service: Urology      Family History:     Family History   Problem Relation Age of Onset   • Diabetes Father    • Hypertension Father    • Mental illness Father    • Diabetes Maternal Grandmother    • Hypertension Mother    • Heart failure Mother    •  Coronary artery disease Mother    • Mental illness Mother    • Heart failure Brother    • Coronary artery disease Brother    • Hypertension Brother    • Diabetes Family       Social History:     Social History     Socioeconomic History   • Marital status: /Civil Union     Spouse name: None   • Number of children: None   • Years of education: None   • Highest education level: None   Occupational History   • None   Tobacco Use   • Smoking status: Never   • Smokeless tobacco: Never   Substance and Sexual Activity   • Alcohol use: No   • Drug use: No   • Sexual activity: None   Other Topics Concern   • None   Social History Narrative    Non -Smoker     No known smoke exposure     No alcohol     No illicit drug use     Seatbelt use     Exercise regularly     Mormon      Social Determinants of Health     Financial Resource Strain: Low Risk  (10/24/2022)    Overall Financial Resource Strain (CARDIA)    • Difficulty of Paying Living Expenses: Not hard at all   Food Insecurity: No Food Insecurity (6/5/2024)    Hunger Vital Sign    • Worried About Running Out of Food in the Last Year: Never true    • Ran Out of Food in the Last Year: Never true   Transportation Needs: No Transportation Needs (6/5/2024)    PRAPARE - Transportation    • Lack of Transportation (Medical): No    • Lack of Transportation (Non-Medical): No   Physical Activity: Sufficiently Active (2/20/2020)    Exercise Vital Sign    • Days of Exercise per Week: 3 days    • Minutes of Exercise per Session: 60 min   Stress: No Stress Concern Present (2/20/2020)    [employer] - Occupational Stress Questionnaire    • Feeling of Stress : Not at all   Social Connections: Unknown (2/20/2020)    Social Connection and Isolation Panel [NHANES]    • Frequency of Communication with Friends and Family: Patient declined    • Frequency of Social Gatherings with Friends and Family: Patient declined    • Attends Protestant Services: Patient  declined    • Active Member of Clubs or Organizations: Patient declined    • Attends Club or Organization Meetings: Patient declined    • Marital Status: Patient declined   Intimate Partner Violence: Not At Risk (2/20/2020)    Humiliation, Afraid, Rape, and Kick questionnaire    • Fear of Current or Ex-Partner: No    • Emotionally Abused: No    • Physically Abused: No    • Sexually Abused: No   Housing Stability: Unknown (6/5/2024)    Housing Stability Vital Sign    • Unable to Pay for Housing in the Last Year: No    • Number of Times Moved in the Last Year: Not on file    • Homeless in the Last Year: No      Medications and Allergies:     Current Outpatient Medications   Medication Sig Dispense Refill   • atorvastatin (LIPITOR) 40 mg tablet Take 1 tablet (40 mg total) by mouth daily 90 tablet 3   • Blood Pressure Monitoring (BLOOD PRESSURE CUFF) MISC by Does not apply route daily 1 each 0   • cyclobenzaprine (FLEXERIL) 10 mg tablet Take 1 tablet (10 mg total) by mouth 2 (two) times a day as needed for muscle spasms 20 tablet 0   • DULoxetine (CYMBALTA) 20 mg capsule Take 20 mg by mouth daily       • E-Z JECT LANCETS MISC to use twice a day, fasting and 2 hrs after diner     • Empagliflozin (Jardiance) 10 MG TABS tablet Take 1 tablet (10 mg total) by mouth every morning 90 tablet 3   • gabapentin (NEURONTIN) 100 mg capsule Take 1 capsule by mouth 3 (three) times a day     • glucose blood test strip Testing sugars twice a day 200 each 1   • glucose monitoring kit (FREESTYLE) monitoring kit      • hydrOXYzine pamoate (VISTARIL) 25 mg capsule Take 50 mg by mouth in the morning     • metFORMIN (GLUCOPHAGE) 500 mg tablet Take 1 tablet (500 mg total) by mouth 2 (two) times a day with meals 180 tablet 3   • metoprolol succinate (TOPROL-XL) 25 mg 24 hr tablet Take 0.5 tablets (12.5 mg total) by mouth 2 (two) times a day 45 tablet 3   • omega-3-acid ethyl esters (LOVAZA) 1 g capsule Take 2 capsules (2 g total) by mouth 2  (two) times a day 360 capsule 3   • sacubitril-valsartan (Entresto) 24-26 MG TABS Take 1 tablet by mouth 2 (two) times a day 180 tablet 3   • ibuprofen (MOTRIN) 400 mg tablet Take 1 tablet (400 mg total) by mouth every 6 (six) hours as needed for mild pain (Body aches or fever) for up to 7 days 30 tablet 0   • triamcinolone (KENALOG) 0.5 % ointment Apply topically 2 (two) times a day (Patient not taking: Reported on 3/2/2022) 30 g 0     No current facility-administered medications for this visit.     Allergies   Allergen Reactions   • No Known Allergies       Immunizations:     Immunization History   Administered Date(s) Administered   • COVID-19 PFIZER VACCINE 0.3 ML IM 03/31/2021, 04/21/2021   • INFLUENZA 10/09/2015, 11/02/2016, 09/27/2017   • Influenza, recombinant, quadrivalent,injectable, preservative free 02/18/2019   • Influenza, seasonal, injectable 11/17/2010, 09/13/2012, 09/13/2013, 10/02/2014      Health Maintenance:         Topic Date Due   • HIV Screening  Never done   • Colorectal Cancer Screening  Never done   • Hepatitis C Screening  Completed         Topic Date Due   • Pneumococcal Vaccine: Pediatrics (0 to 5 Years) and At-Risk Patients (6 to 64 Years) (1 of 2 - PCV) Never done   • COVID-19 Vaccine (3 - 2023-24 season) 09/01/2023   • Influenza Vaccine (Season Ended) 09/01/2024      Medicare Screening Tests and Risk Assessments:     Wes is here for his Subsequent Wellness visit. Last Medicare Wellness visit information reviewed, patient interviewed and updates made to the record today.      Health Risk Assessment:   Patient rates overall health as good. Patient feels that their physical health rating is same. Patient is satisfied with their life. Eyesight was rated as same. Hearing was rated as same. Patient feels that their emotional and mental health rating is same. Patients states they are never, rarely angry. Patient states they are sometimes unusually tired/fatigued. Pain experienced in  the last 7 days has been some. Patient's pain rating has been 5/10. Patient states that he has experienced no weight loss or gain in last 6 months. Patient reports right elbow pain.  Discussed with patient.    Depression Screening:   PHQ-9 Score: 3      Fall Risk Screening:   In the past year, patient has experienced: no history of falling in past year      Home Safety:  Patient does not have trouble with stairs inside or outside of their home. Patient has working smoke alarms and has working carbon monoxide detector. Home safety hazards include: none.     Nutrition:   Current diet is Regular and No Added Salt.     Medications:   Patient is currently taking over-the-counter supplements. OTC medications include: see medication list. Patient is able to manage medications.     Activities of Daily Living (ADLs)/Instrumental Activities of Daily Living (IADLs):   Walk and transfer into and out of bed and chair?: Yes  Dress and groom yourself?: Yes    Bathe or shower yourself?: Yes    Feed yourself? Yes  Do your laundry/housekeeping?: Yes  Manage your money, pay your bills and track your expenses?: Yes  Make your own meals?: Yes    Do your own shopping?: Yes    Previous Hospitalizations:   Any hospitalizations or ED visits within the last 12 months?: No      Advance Care Planning:   Living will: No    Advanced directive: No    ACP document given: Yes      Comments: Discussed with patient.    Cognitive Screening:   Provider or family/friend/caregiver concerned regarding cognition?: No    PREVENTIVE SCREENINGS      Cardiovascular Screening:    General: History Lipid Disorder      Diabetes Screening:     General: History Diabetes      Colorectal Cancer Screening:     General: Risks and Benefits Discussed    Due for: Colonoscopy - High Risk      Prostate Cancer Screening:    General: Screening Not Indicated and Risks and Benefits Discussed    Due for: PSA      Lung Cancer Screening:     General: Screening Not Indicated       "Hepatitis C Screening:    General: Screening Current    Screening, Brief Intervention, and Referral to Treatment (SBIRT)    Screening  Typical number of drinks in a day: 0  Typical number of drinks in a week: 0  Interpretation: Low risk drinking behavior.    AUDIT-C Screenin) How often did you have a drink containing alcohol in the past year? never  2) How many drinks did you have on a typical day when you were drinking in the past year? 0  3) How often did you have 6 or more drinks on one occasion in the past year? never    AUDIT-C Score: 0  Interpretation: Score 0-3 (male): Negative screen for alcohol misuse    Single Item Drug Screening:  How often have you used an illegal drug (including marijuana) or a prescription medication for non-medical reasons in the past year? never    Single Item Drug Screen Score: 0  Interpretation: Negative screen for possible drug use disorder    Other Counseling Topics:   Car/seat belt/driving safety, skin self-exam, sunscreen and calcium and vitamin D intake and regular weightbearing exercise.     No results found.     Physical Exam:     /58 (BP Location: Left arm, Patient Position: Sitting, Cuff Size: Adult)   Pulse 86   Temp 97.8 °F (36.6 °C) (Temporal)   Resp 16   Ht 5' 5\" (1.651 m)   Wt 83 kg (183 lb)   SpO2 98%   BMI 30.45 kg/m²     Physical Exam  Vitals reviewed.   Constitutional:       General: He is not in acute distress.     Appearance: Normal appearance. He is obese. He is not ill-appearing.   HENT:      Head: Normocephalic and atraumatic.      Right Ear: Tympanic membrane, ear canal and external ear normal.      Left Ear: Tympanic membrane, ear canal and external ear normal.      Mouth/Throat:      Mouth: Mucous membranes are moist.      Pharynx: Oropharynx is clear.   Eyes:      Extraocular Movements: Extraocular movements intact.      Conjunctiva/sclera: Conjunctivae normal.   Neck:      Vascular: No carotid bruit.   Cardiovascular:      Rate and " Rhythm: Normal rate and regular rhythm.   Pulmonary:      Effort: Pulmonary effort is normal.      Breath sounds: Normal breath sounds.   Abdominal:      General: Bowel sounds are normal.      Palpations: Abdomen is soft.      Tenderness: There is no abdominal tenderness. There is no right CVA tenderness or left CVA tenderness.   Musculoskeletal:         General: No tenderness.      Right lower leg: No edema.      Left lower leg: No edema.      Comments: No calf tenderness bilateral.   Lymphadenopathy:      Cervical: No cervical adenopathy.   Skin:     General: Skin is warm.      Findings: No rash.   Neurological:      General: No focal deficit present.      Mental Status: He is alert and oriented to person, place, and time.   Psychiatric:         Mood and Affect: Mood normal.         Behavior: Behavior normal.         Thought Content: Thought content normal.      Comments: His anxiety and depression discussed briefly with patient.  Seen by psych and get therapy also.  Patient denies SI HI.          Darshan Burgess MD

## 2024-06-13 ENCOUNTER — HOSPITAL ENCOUNTER (OUTPATIENT)
Dept: RADIOLOGY | Facility: HOSPITAL | Age: 44
Discharge: HOME | End: 2024-06-13
Attending: FAMILY MEDICINE
Payer: MEDICARE

## 2024-06-13 ENCOUNTER — APPOINTMENT (OUTPATIENT)
Dept: RADIOLOGY | Facility: HOSPITAL | Age: 44
End: 2024-06-13
Payer: MEDICARE

## 2024-06-13 DIAGNOSIS — R35.1 NOCTURIA: ICD-10-CM

## 2024-06-13 DIAGNOSIS — E11.8 CONTROLLED DIABETES MELLITUS TYPE 2 WITH COMPLICATIONS, UNSPECIFIED WHETHER LONG TERM INSULIN USE (HCC): ICD-10-CM

## 2024-06-13 PROCEDURE — 76770 US EXAM ABDO BACK WALL COMP: CPT

## 2024-06-13 PROCEDURE — 76706 US ABDL AORTA SCREEN AAA: CPT

## 2024-08-01 ENCOUNTER — APPOINTMENT (OUTPATIENT)
Dept: LAB | Facility: CLINIC | Age: 44
End: 2024-08-01
Payer: MEDICARE

## 2024-08-01 DIAGNOSIS — R35.1 NOCTURIA: ICD-10-CM

## 2024-08-01 DIAGNOSIS — F32.5 MAJOR DEPRESSIVE DISORDER IN REMISSION, UNSPECIFIED WHETHER RECURRENT (HCC): ICD-10-CM

## 2024-08-01 DIAGNOSIS — I10 BENIGN ESSENTIAL HYPERTENSION: ICD-10-CM

## 2024-08-01 DIAGNOSIS — I50.9 CONGESTIVE HEART FAILURE, UNSPECIFIED HF CHRONICITY, UNSPECIFIED HEART FAILURE TYPE (HCC): ICD-10-CM

## 2024-08-01 DIAGNOSIS — E11.8 CONTROLLED DIABETES MELLITUS TYPE 2 WITH COMPLICATIONS, UNSPECIFIED WHETHER LONG TERM INSULIN USE (HCC): ICD-10-CM

## 2024-08-01 DIAGNOSIS — Z00.00 MEDICARE ANNUAL WELLNESS VISIT, SUBSEQUENT: ICD-10-CM

## 2024-08-01 DIAGNOSIS — F41.9 ANXIETY: ICD-10-CM

## 2024-08-01 DIAGNOSIS — Z11.4 SCREENING FOR HIV (HUMAN IMMUNODEFICIENCY VIRUS): ICD-10-CM

## 2024-08-01 DIAGNOSIS — E78.2 MIXED HYPERLIPIDEMIA: ICD-10-CM

## 2024-08-01 LAB
ALBUMIN SERPL BCG-MCNC: 4.1 G/DL (ref 3.5–5)
ALP SERPL-CCNC: 76 U/L (ref 34–104)
ALT SERPL W P-5'-P-CCNC: 19 U/L (ref 7–52)
ANION GAP SERPL CALCULATED.3IONS-SCNC: 7 MMOL/L (ref 4–13)
AST SERPL W P-5'-P-CCNC: 17 U/L (ref 13–39)
BACTERIA UR QL AUTO: NORMAL /HPF
BASOPHILS # BLD AUTO: 0.04 THOUSANDS/ÂΜL (ref 0–0.1)
BASOPHILS NFR BLD AUTO: 1 % (ref 0–1)
BILIRUB SERPL-MCNC: 0.48 MG/DL (ref 0.2–1)
BILIRUB UR QL STRIP: NEGATIVE
BNP SERPL-MCNC: 12 PG/ML (ref 0–100)
BUN SERPL-MCNC: 18 MG/DL (ref 5–25)
CALCIUM SERPL-MCNC: 9.2 MG/DL (ref 8.4–10.2)
CHLORIDE SERPL-SCNC: 103 MMOL/L (ref 96–108)
CHOLEST SERPL-MCNC: 151 MG/DL
CLARITY UR: CLEAR
CO2 SERPL-SCNC: 26 MMOL/L (ref 21–32)
COLOR UR: YELLOW
CREAT SERPL-MCNC: 0.84 MG/DL (ref 0.6–1.3)
CREAT UR-MCNC: 55.9 MG/DL
EOSINOPHIL # BLD AUTO: 0.05 THOUSAND/ÂΜL (ref 0–0.61)
EOSINOPHIL NFR BLD AUTO: 1 % (ref 0–6)
ERYTHROCYTE [DISTWIDTH] IN BLOOD BY AUTOMATED COUNT: 13.3 % (ref 11.6–15.1)
EST. AVERAGE GLUCOSE BLD GHB EST-MCNC: 131 MG/DL
GFR SERPL CREATININE-BSD FRML MDRD: 105 ML/MIN/1.73SQ M
GLUCOSE P FAST SERPL-MCNC: 110 MG/DL (ref 65–99)
GLUCOSE UR STRIP-MCNC: ABNORMAL MG/DL
HBA1C MFR BLD: 6.2 %
HCT VFR BLD AUTO: 51.1 % (ref 36.5–49.3)
HDLC SERPL-MCNC: 39 MG/DL
HGB BLD-MCNC: 16 G/DL (ref 12–17)
HGB UR QL STRIP.AUTO: NEGATIVE
HIV 1+2 AB+HIV1 P24 AG SERPL QL IA: NORMAL
HIV 2 AB SERPL QL IA: NORMAL
HIV1 AB SERPL QL IA: NORMAL
HIV1 P24 AG SERPL QL IA: NORMAL
IMM GRANULOCYTES # BLD AUTO: 0.01 THOUSAND/UL (ref 0–0.2)
IMM GRANULOCYTES NFR BLD AUTO: 0 % (ref 0–2)
KETONES UR STRIP-MCNC: NEGATIVE MG/DL
LDLC SERPL CALC-MCNC: 90 MG/DL (ref 0–100)
LEUKOCYTE ESTERASE UR QL STRIP: ABNORMAL
LYMPHOCYTES # BLD AUTO: 1.29 THOUSANDS/ÂΜL (ref 0.6–4.47)
LYMPHOCYTES NFR BLD AUTO: 24 % (ref 14–44)
MCH RBC QN AUTO: 26.7 PG (ref 26.8–34.3)
MCHC RBC AUTO-ENTMCNC: 31.3 G/DL (ref 31.4–37.4)
MCV RBC AUTO: 85 FL (ref 82–98)
MICROALBUMIN UR-MCNC: <7 MG/L
MONOCYTES # BLD AUTO: 0.56 THOUSAND/ÂΜL (ref 0.17–1.22)
MONOCYTES NFR BLD AUTO: 10 % (ref 4–12)
NEUTROPHILS # BLD AUTO: 3.42 THOUSANDS/ÂΜL (ref 1.85–7.62)
NEUTS SEG NFR BLD AUTO: 64 % (ref 43–75)
NITRITE UR QL STRIP: NEGATIVE
NON-SQ EPI CELLS URNS QL MICRO: NORMAL /HPF
NRBC BLD AUTO-RTO: 0 /100 WBCS
PH UR STRIP.AUTO: 6.5 [PH]
PLATELET # BLD AUTO: 178 THOUSANDS/UL (ref 149–390)
PMV BLD AUTO: 12.1 FL (ref 8.9–12.7)
POTASSIUM SERPL-SCNC: 4.6 MMOL/L (ref 3.5–5.3)
PROT SERPL-MCNC: 7.4 G/DL (ref 6.4–8.4)
PROT UR STRIP-MCNC: NEGATIVE MG/DL
PSA FREE MFR SERPL: 25.11 %
PSA FREE SERPL-MCNC: 0.06 NG/ML
PSA SERPL-MCNC: 0.22 NG/ML (ref 0–4)
RBC # BLD AUTO: 6 MILLION/UL (ref 3.88–5.62)
RBC #/AREA URNS AUTO: NORMAL /HPF
SODIUM SERPL-SCNC: 136 MMOL/L (ref 135–147)
SP GR UR STRIP.AUTO: 1.01 (ref 1–1.03)
TRIGL SERPL-MCNC: 108 MG/DL
TSH SERPL DL<=0.05 MIU/L-ACNC: 0.98 UIU/ML (ref 0.45–4.5)
UROBILINOGEN UR STRIP-ACNC: <2 MG/DL
WBC # BLD AUTO: 5.37 THOUSAND/UL (ref 4.31–10.16)
WBC #/AREA URNS AUTO: NORMAL /HPF

## 2024-08-01 PROCEDURE — 80061 LIPID PANEL: CPT

## 2024-08-01 PROCEDURE — 82570 ASSAY OF URINE CREATININE: CPT

## 2024-08-01 PROCEDURE — 83036 HEMOGLOBIN GLYCOSYLATED A1C: CPT

## 2024-08-01 PROCEDURE — 84154 ASSAY OF PSA FREE: CPT

## 2024-08-01 PROCEDURE — 85025 COMPLETE CBC W/AUTO DIFF WBC: CPT

## 2024-08-01 PROCEDURE — 84443 ASSAY THYROID STIM HORMONE: CPT

## 2024-08-01 PROCEDURE — 87389 HIV-1 AG W/HIV-1&-2 AB AG IA: CPT

## 2024-08-01 PROCEDURE — 81001 URINALYSIS AUTO W/SCOPE: CPT

## 2024-08-01 PROCEDURE — 84153 ASSAY OF PSA TOTAL: CPT

## 2024-08-01 PROCEDURE — 82043 UR ALBUMIN QUANTITATIVE: CPT

## 2024-08-01 PROCEDURE — 80053 COMPREHEN METABOLIC PANEL: CPT

## 2024-08-01 PROCEDURE — 36415 COLL VENOUS BLD VENIPUNCTURE: CPT

## 2024-08-01 PROCEDURE — 83880 ASSAY OF NATRIURETIC PEPTIDE: CPT

## 2024-08-12 NOTE — TELEPHONE ENCOUNTER
Patient called requesting refill for Jardiance. Patient made aware medication was refilled on 6/5/24 for 30 with 3 refills to [facility] pharmacy. Patient instructed to contact the pharmacy to obtain refills of medication. Patient verbalized understanding.

## 2024-08-13 ENCOUNTER — OFFICE VISIT (OUTPATIENT)
Dept: FAMILY MEDICINE CLINIC | Facility: CLINIC | Age: 44
End: 2024-08-13
Payer: MEDICARE

## 2024-08-13 VITALS
HEIGHT: 65 IN | BODY MASS INDEX: 30.79 KG/M2 | OXYGEN SATURATION: 96 % | SYSTOLIC BLOOD PRESSURE: 117 MMHG | RESPIRATION RATE: 18 BRPM | TEMPERATURE: 97.9 F | WEIGHT: 184.8 LBS | DIASTOLIC BLOOD PRESSURE: 59 MMHG | HEART RATE: 70 BPM

## 2024-08-13 DIAGNOSIS — I10 BENIGN ESSENTIAL HYPERTENSION: ICD-10-CM

## 2024-08-13 DIAGNOSIS — F31.70 BIPOLAR AFFECTIVE DISORDER IN REMISSION (HCC): ICD-10-CM

## 2024-08-13 DIAGNOSIS — M54.40 BILATERAL LOW BACK PAIN WITH SCIATICA, SCIATICA LATERALITY UNSPECIFIED, UNSPECIFIED CHRONICITY: ICD-10-CM

## 2024-08-13 DIAGNOSIS — E78.2 MIXED HYPERLIPIDEMIA: ICD-10-CM

## 2024-08-13 DIAGNOSIS — F41.9 ANXIETY: ICD-10-CM

## 2024-08-13 DIAGNOSIS — Z12.11 SCREEN FOR COLON CANCER: ICD-10-CM

## 2024-08-13 DIAGNOSIS — E11.65 INADEQUATELY CONTROLLED DIABETES MELLITUS (HCC): Primary | ICD-10-CM

## 2024-08-13 DIAGNOSIS — I42.9 CARDIOMYOPATHY, UNSPECIFIED TYPE (HCC): ICD-10-CM

## 2024-08-13 DIAGNOSIS — E66.09 CLASS 1 OBESITY DUE TO EXCESS CALORIES WITHOUT SERIOUS COMORBIDITY WITH BODY MASS INDEX (BMI) OF 30.0 TO 30.9 IN ADULT: ICD-10-CM

## 2024-08-13 DIAGNOSIS — N40.1 BENIGN NON-NODULAR PROSTATIC HYPERPLASIA WITH LOWER URINARY TRACT SYMPTOMS: ICD-10-CM

## 2024-08-13 PROCEDURE — G2211 COMPLEX E/M VISIT ADD ON: HCPCS | Performed by: FAMILY MEDICINE

## 2024-08-13 PROCEDURE — 99214 OFFICE O/P EST MOD 30 MIN: CPT | Performed by: FAMILY MEDICINE

## 2024-08-13 RX ORDER — CYCLOBENZAPRINE HCL 10 MG
10 TABLET ORAL 2 TIMES DAILY PRN
Qty: 20 TABLET | Refills: 0 | Status: SHIPPED | OUTPATIENT
Start: 2024-08-13

## 2024-08-13 NOTE — PROGRESS NOTES
Ambulatory Visit  Name: Wes Virgen      : 1980      MRN: [id number]  Encounter Provider: Darshan Burgess MD  Encounter Date: 2024   Encounter department: [facility]    Assessment & Plan   1. Inadequately controlled diabetes mellitus (HCC)  -     Hemoglobin A1C; Future; Expected date: 2025  -     Comprehensive metabolic panel; Future; Expected date: 2025  -     Albumin / creatinine urine ratio; Future; Expected date: 2025  -     TSH, 3rd generation with Free T4 reflex; Future; Expected date: 2025  -     Ambulatory referral to Ophthalmology; Future; Expected date: 2024  2. Mixed hyperlipidemia  -     Comprehensive metabolic panel; Future; Expected date: 2025  -     Lipid Panel with Direct LDL reflex; Future; Expected date: 2025  3. Benign essential hypertension  -     Comprehensive metabolic panel; Future; Expected date: 2025  -     CBC and differential; Future; Expected date: 2025  -     TSH, 3rd generation with Free T4 reflex; Future; Expected date: 2025  -     UA w Reflex to Microscopic w Reflex to Culture; Future  4. Cardiomyopathy, unspecified type (HCC)  -     Comprehensive metabolic panel; Future; Expected date: 2025  -     TSH, 3rd generation with Free T4 reflex; Future; Expected date: 2025  5. Benign non-nodular prostatic hyperplasia with lower urinary tract symptoms  6. Anxiety  7. Bipolar affective disorder in remission (HCC)  8. Bilateral low back pain with sciatica, sciatica laterality unspecified, unspecified chronicity  -     cyclobenzaprine (FLEXERIL) 10 mg tablet; Take 1 tablet (10 mg total) by mouth 2 (two) times a day as needed for muscle spasms  9. Class 1 obesity due to excess calories without serious comorbidity with body mass index (BMI) of 30.0 to 30.9 in adult  10. Screen for colon cancer  -     Ambulatory Referral to Gastroenterology; Future      Regarding his  diabetes, his glucose was 110 which was less than 111 from March of this year.  And his A1c went up to 6.2 from 6.1 from March of this year.  In April of last year was 5.7.  Patient advised to do a better diet in terms of having less starches and sweets.  Patient admits to having more right.  Continue current therapy.  Patient had a negative AAA screen in June of this year.  Patient had a diabetic foot exam today.  Patient appropriately advised.  Recheck labs again in 6 months.  And patient sent to ophthalmology as well.  Regarding his hyperlipidemia patient's LFTs are normal.  Patient's total cholesterol went up to 151 from 86 from March of this year, triglycerides went up to 108 from 69, HDL went up to 39 from 32 and his LDL went up to 90 from 40 from March of this year.  Patient does admit to missing his statins at times.  Patient advised not to do that.  Continue cutting down on his starches and fats and sweets.  Exercise as well.  I will recheck his labs again in 6 months.    Regarding his hypertension and cardiomyopathy history, discussed with patient.  Patient blood pressure is okay.  Patient advised to continue his current therapy.  Patient's BNP was 12.  And patient to follow-up with his cardiologist as scheduled.  Regarding his BPH history with nocturia, discussed with patient.  Patient is renal bladder sonogram with PVR only showed 16 mL of PVR.  His PSA was also normal; 0.22.  Discussed with patient.  Continue to monitor.  Follow-up with urology.  Regarding his psych diagnoses which consist of anxiety depression bipolar, discussed with patient.  Patient asserts that he is stable on his current therapy.  Patient denies SI HI.  Will continue to monitor as well.  And follow-up with psych.  Regarding his low back pain history, discussed with patient.  Patient education.  Patient denies any neuro red flags.  And his muscle relaxer is refilled and to be used as needed and judiciously.  Regarding his obesity,  "discussed with patient.  Patient advised to lose weight with better diet and exercise as tolerated.  Continue to monitor.  Patient is sent to GI for colonoscopy.  RTO 6 months and do the blood work and urine before.  Patient can see me prior to that for anything else in between.               History of Present Illness     43-year-old male here accompanied by his wife for an evaluation of his diabetes lipids and hypertension.  Patient did blood work and urine recently.  Patient also had a AAA screen done which was negative and he had a renal bladder sonogram which was negative except with a PVR of 16 mL in June of this year.  Patient also sees cardiology regarding his cardiomyopathy history.  Patient denies tobacco alcohol or drugs.  Patient is requesting a refill on his muscle relaxer that he takes from time to time when he has some back pain.  Patient denies any neuro red flags.        Review of Systems   Constitutional:  Negative for chills, fatigue, fever and unexpected weight change.   HENT:  Negative for congestion and sore throat.    Eyes:  Negative for visual disturbance.   Respiratory:  Negative for cough and shortness of breath.    Cardiovascular:  Negative for chest pain and palpitations.   Gastrointestinal:  Negative for abdominal pain and blood in stool.   Genitourinary:  Negative for dysuria and hematuria.   Musculoskeletal:  Positive for back pain.   Skin:  Negative for rash.   Neurological:  Negative for dizziness and headaches.   Psychiatric/Behavioral:  Positive for dysphoric mood. Negative for self-injury and suicidal ideas. The patient is nervous/anxious.        Objective     /59 (BP Location: Left arm, Patient Position: Sitting, Cuff Size: Adult)   Pulse 70   Temp 97.9 °F (36.6 °C) (Temporal)   Resp 18   Ht 5' 5\" (1.651 m)   Wt 83.8 kg (184 lb 12.8 oz)   SpO2 96%   BMI 30.75 kg/m²     Physical Exam  Vitals reviewed.   Constitutional:       General: He is not in acute distress.     " Appearance: Normal appearance. He is obese. He is not ill-appearing.   HENT:      Head: Normocephalic and atraumatic.      Mouth/Throat:      Mouth: Mucous membranes are moist.   Eyes:      Extraocular Movements: Extraocular movements intact.      Conjunctiva/sclera: Conjunctivae normal.   Neck:      Vascular: No carotid bruit.   Cardiovascular:      Rate and Rhythm: Normal rate and regular rhythm.   Pulmonary:      Effort: Pulmonary effort is normal.      Breath sounds: Normal breath sounds.   Musculoskeletal:         General: No tenderness.      Right lower leg: No edema.      Left lower leg: No edema.      Comments: No calf tenderness bilateral.   Skin:     General: Skin is warm.      Findings: No rash.   Neurological:      General: No focal deficit present.      Mental Status: He is alert and oriented to person, place, and time.   Psychiatric:         Mood and Affect: Mood normal.         Behavior: Behavior normal.         Thought Content: Thought content normal.         Diabetic Foot Exam    Diabetic Foot Exam        Administrative Statements

## 2024-12-23 ENCOUNTER — TELEPHONE (OUTPATIENT)
Age: 44
End: 2024-12-23

## 2025-02-07 ENCOUNTER — RA CDI HCC (OUTPATIENT)
Dept: OTHER | Facility: HOSPITAL | Age: 45
End: 2025-02-07

## 2025-02-07 NOTE — PROGRESS NOTES
HCC coding opportunities          Chart Reviewed number of suggestions sent to Provider: 1     Patients Insurance     Medicare Insurance: [employer]          I11.0: Hypertensive heart disease with heart failure (HCC)    Per ICD 10 CM coding guidelines the classification presumes a causal relationship between hypertension and heart involvement and between hypertension unless the documentation clearly states the conditions are unrelated

## 2025-02-17 DIAGNOSIS — I42.9 CARDIOMYOPATHY, UNSPECIFIED TYPE (HCC): ICD-10-CM

## 2025-02-18 RX ORDER — METOPROLOL SUCCINATE 25 MG/1
12.5 TABLET, EXTENDED RELEASE ORAL 2 TIMES DAILY
Qty: 30 TABLET | Refills: 0 | Status: SHIPPED | OUTPATIENT
Start: 2025-02-18

## 2025-03-13 ENCOUNTER — APPOINTMENT (OUTPATIENT)
Dept: LAB | Facility: CLINIC | Age: 45
End: 2025-03-13
Payer: MEDICARE

## 2025-03-13 DIAGNOSIS — E11.65 INADEQUATELY CONTROLLED DIABETES MELLITUS (HCC): ICD-10-CM

## 2025-03-13 DIAGNOSIS — I10 BENIGN ESSENTIAL HYPERTENSION: ICD-10-CM

## 2025-03-13 DIAGNOSIS — I42.9 CARDIOMYOPATHY, UNSPECIFIED TYPE (HCC): ICD-10-CM

## 2025-03-13 DIAGNOSIS — E78.2 MIXED HYPERLIPIDEMIA: ICD-10-CM

## 2025-03-13 LAB
ALBUMIN SERPL BCG-MCNC: 4 G/DL (ref 3.5–5)
ALP SERPL-CCNC: 88 U/L (ref 34–104)
ALT SERPL W P-5'-P-CCNC: 20 U/L (ref 7–52)
ANION GAP SERPL CALCULATED.3IONS-SCNC: 8 MMOL/L (ref 4–13)
AST SERPL W P-5'-P-CCNC: 17 U/L (ref 13–39)
BACTERIA UR QL AUTO: NORMAL /HPF
BASOPHILS # BLD AUTO: 0.04 THOUSANDS/ÂΜL (ref 0–0.1)
BASOPHILS NFR BLD AUTO: 1 % (ref 0–1)
BILIRUB SERPL-MCNC: 0.51 MG/DL (ref 0.2–1)
BILIRUB UR QL STRIP: NEGATIVE
BUN SERPL-MCNC: 15 MG/DL (ref 5–25)
CALCIUM SERPL-MCNC: 9 MG/DL (ref 8.4–10.2)
CHLORIDE SERPL-SCNC: 103 MMOL/L (ref 96–108)
CHOLEST SERPL-MCNC: 157 MG/DL (ref ?–200)
CLARITY UR: CLEAR
CO2 SERPL-SCNC: 26 MMOL/L (ref 21–32)
COLOR UR: ABNORMAL
CREAT SERPL-MCNC: 0.73 MG/DL (ref 0.6–1.3)
CREAT UR-MCNC: 82 MG/DL
EOSINOPHIL # BLD AUTO: 0.07 THOUSAND/ÂΜL (ref 0–0.61)
EOSINOPHIL NFR BLD AUTO: 1 % (ref 0–6)
ERYTHROCYTE [DISTWIDTH] IN BLOOD BY AUTOMATED COUNT: 13.5 % (ref 11.6–15.1)
EST. AVERAGE GLUCOSE BLD GHB EST-MCNC: 137 MG/DL
GFR SERPL CREATININE-BSD FRML MDRD: 111 ML/MIN/1.73SQ M
GLUCOSE P FAST SERPL-MCNC: 117 MG/DL (ref 65–99)
GLUCOSE UR STRIP-MCNC: ABNORMAL MG/DL
HBA1C MFR BLD: 6.4 %
HCT VFR BLD AUTO: 48.7 % (ref 36.5–49.3)
HDLC SERPL-MCNC: 30 MG/DL
HGB BLD-MCNC: 15.5 G/DL (ref 12–17)
HGB UR QL STRIP.AUTO: NEGATIVE
IMM GRANULOCYTES # BLD AUTO: 0.02 THOUSAND/UL (ref 0–0.2)
IMM GRANULOCYTES NFR BLD AUTO: 0 % (ref 0–2)
KETONES UR STRIP-MCNC: NEGATIVE MG/DL
LDLC SERPL CALC-MCNC: 91 MG/DL (ref 0–100)
LEUKOCYTE ESTERASE UR QL STRIP: ABNORMAL
LYMPHOCYTES # BLD AUTO: 1.9 THOUSANDS/ÂΜL (ref 0.6–4.47)
LYMPHOCYTES NFR BLD AUTO: 30 % (ref 14–44)
MCH RBC QN AUTO: 27.2 PG (ref 26.8–34.3)
MCHC RBC AUTO-ENTMCNC: 31.8 G/DL (ref 31.4–37.4)
MCV RBC AUTO: 85 FL (ref 82–98)
MICROALBUMIN UR-MCNC: 8.4 MG/L
MICROALBUMIN/CREAT 24H UR: 10 MG/G CREATININE (ref 0–30)
MONOCYTES # BLD AUTO: 0.68 THOUSAND/ÂΜL (ref 0.17–1.22)
MONOCYTES NFR BLD AUTO: 11 % (ref 4–12)
NEUTROPHILS # BLD AUTO: 3.55 THOUSANDS/ÂΜL (ref 1.85–7.62)
NEUTS SEG NFR BLD AUTO: 57 % (ref 43–75)
NITRITE UR QL STRIP: NEGATIVE
NON-SQ EPI CELLS URNS QL MICRO: NORMAL /HPF
NRBC BLD AUTO-RTO: 0 /100 WBCS
PH UR STRIP.AUTO: 6 [PH]
PLATELET # BLD AUTO: 217 THOUSANDS/UL (ref 149–390)
PMV BLD AUTO: 11.4 FL (ref 8.9–12.7)
POTASSIUM SERPL-SCNC: 4.3 MMOL/L (ref 3.5–5.3)
PROT SERPL-MCNC: 7 G/DL (ref 6.4–8.4)
PROT UR STRIP-MCNC: NEGATIVE MG/DL
RBC # BLD AUTO: 5.7 MILLION/UL (ref 3.88–5.62)
RBC #/AREA URNS AUTO: NORMAL /HPF
SODIUM SERPL-SCNC: 137 MMOL/L (ref 135–147)
SP GR UR STRIP.AUTO: 1.02 (ref 1–1.03)
TRIGL SERPL-MCNC: 179 MG/DL (ref ?–150)
TSH SERPL DL<=0.05 MIU/L-ACNC: 0.88 UIU/ML (ref 0.45–4.5)
UROBILINOGEN UR STRIP-ACNC: <2 MG/DL
WBC # BLD AUTO: 6.26 THOUSAND/UL (ref 4.31–10.16)
WBC #/AREA URNS AUTO: NORMAL /HPF

## 2025-03-13 PROCEDURE — 80053 COMPREHEN METABOLIC PANEL: CPT

## 2025-03-13 PROCEDURE — 82570 ASSAY OF URINE CREATININE: CPT

## 2025-03-13 PROCEDURE — 81001 URINALYSIS AUTO W/SCOPE: CPT

## 2025-03-13 PROCEDURE — 36415 COLL VENOUS BLD VENIPUNCTURE: CPT

## 2025-03-13 PROCEDURE — 85025 COMPLETE CBC W/AUTO DIFF WBC: CPT

## 2025-03-13 PROCEDURE — 82043 UR ALBUMIN QUANTITATIVE: CPT

## 2025-03-13 PROCEDURE — 80061 LIPID PANEL: CPT

## 2025-03-13 PROCEDURE — 84443 ASSAY THYROID STIM HORMONE: CPT

## 2025-03-13 PROCEDURE — 83036 HEMOGLOBIN GLYCOSYLATED A1C: CPT

## 2025-03-14 ENCOUNTER — RESULTS FOLLOW-UP (OUTPATIENT)
Dept: FAMILY MEDICINE CLINIC | Facility: CLINIC | Age: 45
End: 2025-03-14

## 2025-03-16 NOTE — PROGRESS NOTES
"Advanced Heart Failure Outpatient Progress Note - Wes Virgen 44 y.o. male MRN: [id number]    Encounter: [id number]      Assessment/Plan:    Patient Active Problem List    Diagnosis Date Noted    Bilateral low back pain with sciatica 08/13/2024    Dry skin dermatitis 02/08/2021    Nephrolithiasis 10/06/2020    Mixed hyperlipidemia 07/24/2020    Microalbuminuria 07/24/2020    Class 1 obesity due to excess calories without serious comorbidity with body mass index (BMI) of 30.0 to 30.9 in adult 02/20/2020    Bipolar affective disorder in remission (HCC) 08/16/2018    Vitamin D deficiency 07/19/2018    Benign non-nodular prostatic hyperplasia with lower urinary tract symptoms 05/03/2016    Arthritis 11/06/2014    Congestive heart failure (HCC) 11/06/2014    Depression 11/06/2014    Anxiety 11/06/2014    Sleep apnea 11/06/2014    Scoliosis (and kyphoscoliosis), idiopathic 10/02/2014    Large liver 10/02/2014    Cardiomyopathy (HCC) 05/08/2013    Benign essential hypertension 07/15/2012       # Heart failure with improved EF, NYHA II  Etiology: Nonischemic. H/o palpitations. Mother with history of \"enlarged heart\" so possible familial. Has hypertension but has not been not severely elevated. Patient deferred cardiac MRI.  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 Predominant underlying rhythm was Sinus Rhythm. Isolated SVEs  were rare (<1.0%), and no SVE Couplets or SVE Triplets were present.  Isolated VEs were rare (<1.0%), and no VE " "Couplets or VE Triplets were  Present.    Pharm Nuc Stress 12/2/21: No perfusion defects. LVEF 49%    Echocardiogram 9/7/2021  LVEF: 40%  LVIDd: 4.9cm  RV: normal size and systolic function  MR: trace  PASP: inadequate TR envelope for estimation  RVOT: no notching  Other: normal diastology, concentric LVH (IVSd 1.2 PWd 1.4)    Pharm Nuc Stress 5/13/2013: No perfusion defects. LVEF 51%  TTE 9/5/2012: LVEF 35%. Grade 2 diastology    Diet:  2 g sodium diet  2000 mL fluid restriction    Neurohormonal Blockade:  --Beta-Blocker: metoprolol succinate 12.5mg BID  --ACEi, ARB or ARNi: entresto 24-26mg BID  --Aldosterone Receptor Blocker: none  --SGLT2 Inhibitor: empagliflozin 10mg daily  --Diuretic: none    Sudden Cardiac Death Risk Reduction:  --ICD:  LVEF >35%    Electrical Resynchronization:  --Candidacy for BiV device: narrow QRS    Advanced Therapies (if appropriate):  --We will continue to monitor    # DM type 2  HbA1c 6.4 3/13/25, per PCP  # SKYE, used in 2012, was 262 lbs then, no obstructive sleep apnea on sleep study 4/22/23  # Hypertension, controlled  # Mixed hyperlipidemia  8/3/21: TG 86 LDL86  1/25/22: TG 98 HDL 35   4/17/23: TG 90 HDL 46   3/13/25:  HDL 30 LDL 91  Rx: atorvastatin 40mg daily,  lovaza 2g BID    Today's Plan:  Euvolemic on exam. Noting intermittent episodes of shortness of breath on the same amount of exertion. We will obtain echocardiogram to reassess EF  Continue current cardiac medications at this time  2 g sodium diet  Physical activity/exercise as tolerated        HPI:   44 y.o.  with PMH as above who is for follow up.    4/26/22: Here for follow up. Overall doing fine. He continues to work out but would have intermittent episodes of shortness of breath \"depending on the day\", seems to be overall on the same amount of exertion. No PND or orthopnea. No leg swelling. No palpitations or chest pain. No lightheadedness. History obtained with ALTHEA Darby for translation    Per " FARRAH Mcintyre: 6/23/22. Presents for follow up. ALTHEA Velarde, providing assistance with translation. At last visit, was started on Jardiance 10 mg daily. Follow up BMP stable.  In the ED on 6/17 with SOB. Workup unremarkable. Feels well today but still getting occasional palpitations when at rest. He continues to go to the gym. Does weight training and cardio, which includes playing basketball. He occasionally has to stop to take a break, but overall tolerates well.     9/19/22: Here for follow up. BMP 6/17 unremarkable. Reports doing well. Walks stairs no problems. Still going to the gym and playing basketball. No issues with medications. Occasional lightheadedness with getting up.     12/14/22: Here for follow up. Seen at the ED for fever up to 103 on 11/17/22. Tested positive for COVID 19. No active cardiac issues. Goes to the gym 5 days a week 1.5-2 hours. Doing weights and cardio. Sleeps for about 7 hours but still feeling tired upon waking up    5/4/23: here for follow up. 4/17/23 Na 137 K 4.5 and creatinine 0.85  The patient has no current concerns and is consistently taking his prescribed medications.  He maintains a gym routine, working out 5 days a week for 1 to 2 hours, including 1 hour of cardio exercise, with no reported issues.  He monitors his blood pressure at home, and the average reading ranges from 120/75 mmHg to 120/80 mmHg.  His glucose levels are well-maintained.  He went to the emergency room due to a neck sprain, which he had incurred by stretching his trapezius muscle at home.  There are no problems related to fluid retention, swelling, or bloating.  He denies experiencing any difficulty in breathing when lying down.  He denies dizziness, lightheadedness, or palpitations.    2/12/24: Here for follow up. Weight up on office visit. Correlating on home scale, 178 lbs on home scale. No shortness of breath, leg swelling, abdominal distension. No PND or orthopnea. Taking medications as prescribed.  Continues with regular exercise as above with no perceived limitation.     3/17/25: Here for follow up. Last seen in follow up as above. He reports overall doing well except noting intermittent episodes of shortness of  breath with the same amount of exertion.  Denies leg swelling, PND orthopnea.  Taking medications as prescribed.  Denies palpitations, chest pain, dizziness or lightheadedness.      Past Medical History:   Diagnosis Date    Anxiety     Depression     Diabetes mellitus (HCC)     Type 2    Hypertension        Review of Systems   Constitutional:  Negative for chills, fatigue and fever.   HENT:  Negative for ear pain and sore throat.    Eyes:  Negative for pain and visual disturbance.   Respiratory:  Negative for cough and shortness of breath.    Cardiovascular:  Negative for chest pain, palpitations and leg swelling.   Gastrointestinal:  Negative for abdominal distention, abdominal pain and vomiting.   Genitourinary:  Negative for dysuria and hematuria.   Musculoskeletal:  Negative for arthralgias and back pain.   Skin:  Negative for color change and rash.   Neurological:  Negative for dizziness, seizures, syncope and light-headedness.   All other systems reviewed and are negative.       Allergies   Allergen Reactions    No Known Allergies      .    Current Outpatient Medications:     atorvastatin (LIPITOR) 40 mg tablet, Take 1 tablet (40 mg total) by mouth daily, Disp: 90 tablet, Rfl: 3    Blood Pressure Monitoring (BLOOD PRESSURE CUFF) MISC, by Does not apply route daily, Disp: 1 each, Rfl: 0    cyclobenzaprine (FLEXERIL) 10 mg tablet, Take 1 tablet (10 mg total) by mouth 2 (two) times a day as needed for muscle spasms, Disp: 20 tablet, Rfl: 0    DULoxetine (CYMBALTA) 20 mg capsule, Take 20 mg by mouth daily  , Disp: , Rfl:     E-Z JECT LANCETS MISC, to use twice a day, fasting and 2 hrs after diner, Disp: , Rfl:     Empagliflozin (Jardiance) 10 MG TABS tablet, Take 1 tablet (10 mg total) by mouth  every morning, Disp: 90 tablet, Rfl: 3    gabapentin (NEURONTIN) 100 mg capsule, Take 1 capsule by mouth 3 (three) times a day, Disp: , Rfl:     glucose blood test strip, Testing sugars twice a day, Disp: 200 each, Rfl: 1    glucose monitoring kit (FREESTYLE) monitoring kit, , Disp: , Rfl:     hydrOXYzine pamoate (VISTARIL) 25 mg capsule, Take 50 mg by mouth in the morning, Disp: , Rfl:     metFORMIN (GLUCOPHAGE) 500 mg tablet, Take 1 tablet (500 mg total) by mouth 2 (two) times a day with meals, Disp: 180 tablet, Rfl: 3    metoprolol succinate (TOPROL-XL) 25 mg 24 hr tablet, TAKE 0.5 TABLETS (12.5 MG TOTAL) BY MOUTH 2 (TWO) TIMES A DAY, Disp: 30 tablet, Rfl: 0    omega-3-acid ethyl esters (LOVAZA) 1 g capsule, Take 2 capsules (2 g total) by mouth 2 (two) times a day, Disp: 360 capsule, Rfl: 3    sacubitril-valsartan (Entresto) 24-26 MG TABS, Take 1 tablet by mouth 2 (two) times a day, Disp: 180 tablet, Rfl: 3    ibuprofen (MOTRIN) 400 mg tablet, Take 1 tablet (400 mg total) by mouth every 6 (six) hours as needed for mild pain (Body aches or fever) for up to 7 days, Disp: 30 tablet, Rfl: 0    triamcinolone (KENALOG) 0.5 % ointment, Apply topically 2 (two) times a day (Patient not taking: Reported on 3/2/2022), Disp: 30 g, Rfl: 0    Social History     Socioeconomic History    Marital status: /Civil Union     Spouse name: Not on file    Number of children: Not on file    Years of education: Not on file    Highest education level: Not on file   Occupational History    Not on file   Tobacco Use    Smoking status: Never    Smokeless tobacco: Never   Substance and Sexual Activity    Alcohol use: No    Drug use: No    Sexual activity: Not on file   Other Topics Concern    Not on file   Social History Narrative    Non -Smoker     No known smoke exposure     No alcohol     No illicit drug use     Seatbelt use     Exercise regularly     Sabianism      Social Drivers of Health     Financial Resource Strain: Low Risk   (10/24/2022)    Overall Financial Resource Strain (CARDIA)     Difficulty of Paying Living Expenses: Not hard at all   Food Insecurity: No Food Insecurity (6/5/2024)    Nursing - Inadequate Food Risk Classification     Worried About Running Out of Food in the Last Year: Never true     Ran Out of Food in the Last Year: Never true     Ran Out of Food in the Last Year: Not on file   Transportation Needs: No Transportation Needs (6/5/2024)    PRAPARE - Transportation     Lack of Transportation (Medical): No     Lack of Transportation (Non-Medical): No   Physical Activity: Sufficiently Active (2/20/2020)    Exercise Vital Sign     Days of Exercise per Week: 3 days     Minutes of Exercise per Session: 60 min   Stress: No Stress Concern Present (2/20/2020)    [employer] [employer] - Occupational Stress Questionnaire     Feeling of Stress : Not at all   Social Connections: Unknown (2/20/2020)    Social Connection and Isolation Panel [NHANES]     Frequency of Communication with Friends and Family: Patient declined     Frequency of Social Gatherings with Friends and Family: Patient declined     Attends Sikh Services: Patient declined     Active Member of Clubs or Organizations: Patient declined     Attends Club or Organization Meetings: Patient declined     Marital Status: Patient declined   Intimate Partner Violence: Not At Risk (2/20/2020)    Humiliation, Afraid, Rape, and Kick questionnaire     Fear of Current or Ex-Partner: No     Emotionally Abused: No     Physically Abused: No     Sexually Abused: No   Housing Stability: Unknown (6/5/2024)    Housing Stability Vital Sign     Unable to Pay for Housing in the Last Year: No     Number of Times Moved in the Last Year: Not on file     Homeless in the Last Year: No       Family History   Problem Relation Age of Onset    Diabetes Father     Hypertension Father     Mental illness Father     Diabetes Maternal Grandmother     Hypertension Mother     Heart  failure Mother     Coronary artery disease Mother     Mental illness Mother     Heart failure Brother     Coronary artery disease Brother     Hypertension Brother     Diabetes Family        Physical Exam:    Vitals: Blood pressure 112/58, pulse 84, weight 84.4 kg (186 lb), SpO2 97%., Body mass index is 30.95 kg/m².,   Wt Readings from Last 3 Encounters:   03/17/25 84.4 kg (186 lb)   08/13/24 83.8 kg (184 lb 12.8 oz)   06/05/24 83 kg (183 lb)       Physical Exam  Constitutional:       General: He is not in acute distress.     Appearance: Normal appearance.   HENT:      Head: Normocephalic and atraumatic.      Mouth/Throat:      Mouth: Mucous membranes are moist.   Eyes:      General: No scleral icterus.     Extraocular Movements: Extraocular movements intact.   Cardiovascular:      Rate and Rhythm: Normal rate and regular rhythm.      Pulses: Normal pulses.      Heart sounds: S1 normal and S2 normal. No murmur heard.     No friction rub. No gallop.      Comments: Nonelevated JVP  Pulmonary:      Effort: Pulmonary effort is normal.      Breath sounds: Normal breath sounds.   Abdominal:      General: There is no distension.      Palpations: Abdomen is soft.      Tenderness: There is no abdominal tenderness. There is no guarding or rebound.   Musculoskeletal:         General: Normal range of motion.      Cervical back: Neck supple.      Right lower leg: No edema.      Left lower leg: No edema.   Skin:     General: Skin is warm and dry.      Capillary Refill: Capillary refill takes less than 2 seconds.   Neurological:      General: No focal deficit present.      Mental Status: He is alert and oriented to person, place, and time.   Psychiatric:         Mood and Affect: Mood normal.       Results  His HbA1c in 04/2023 was 5.7 percent.    Labs & Results:    Lab Results   Component Value Date    WBC 6.26 03/13/2025    HGB 15.5 03/13/2025    HCT 48.7 03/13/2025    MCV 85 03/13/2025     03/13/2025     Lab Results    Component Value Date    SODIUM 137 03/13/2025    K 4.3 03/13/2025     03/13/2025    CO2 26 03/13/2025    BUN 15 03/13/2025    CREATININE 0.73 03/13/2025    GLUC 125 06/17/2022    CALCIUM 9.0 03/13/2025     Lab Results   Component Value Date    NTBNP 30 06/17/2022      Lab Results   Component Value Date    CHOLESTEROL 157 03/13/2025    CHOLESTEROL 151 08/01/2024    CHOLESTEROL 86 03/18/2024     Lab Results   Component Value Date    HDL 30 (L) 03/13/2025    HDL 39 (L) 08/01/2024    HDL 32 (L) 03/18/2024     Lab Results   Component Value Date    TRIG 179 (H) 03/13/2025    TRIG 108 08/01/2024    TRIG 69 03/18/2024     Lab Results   Component Value Date    NONHDLC 54 03/18/2024    NONHDLC 119 04/17/2023    NONHDLC 57 02/08/2023       EKG personally reviewed by Jass Lim.     I have spent a total time of 30 minutes in caring for this patient on the day of the visit/encounter including Diagnostic results, Instructions for management, Patient and family education, Impressions, Counseling / Coordination of care, Documenting in the medical record, Reviewing/placing orders in the medical record (including tests, medications, and/or procedures), and Obtaining or reviewing history  .    Thank you for the opportunity to participate in the care of this patient.    JASS LIM MD  ADVANCED HEART FAILURE AND MECHANICAL CIRCULATORY SUPPORT  [facility]

## 2025-03-17 ENCOUNTER — OFFICE VISIT (OUTPATIENT)
Dept: CARDIOLOGY CLINIC | Facility: CLINIC | Age: 45
End: 2025-03-17
Payer: MEDICARE

## 2025-03-17 VITALS
DIASTOLIC BLOOD PRESSURE: 58 MMHG | SYSTOLIC BLOOD PRESSURE: 112 MMHG | BODY MASS INDEX: 30.95 KG/M2 | WEIGHT: 186 LBS | HEART RATE: 84 BPM | OXYGEN SATURATION: 97 %

## 2025-03-17 DIAGNOSIS — I50.32 HEART FAILURE WITH IMPROVED EJECTION FRACTION (HFIMPEF) (HCC): Primary | ICD-10-CM

## 2025-03-17 DIAGNOSIS — I10 BENIGN ESSENTIAL HYPERTENSION: ICD-10-CM

## 2025-03-17 DIAGNOSIS — E78.2 MIXED HYPERLIPIDEMIA: ICD-10-CM

## 2025-03-17 PROCEDURE — 99214 OFFICE O/P EST MOD 30 MIN: CPT | Performed by: INTERNAL MEDICINE

## 2025-03-17 PROCEDURE — G2211 COMPLEX E/M VISIT ADD ON: HCPCS | Performed by: INTERNAL MEDICINE

## 2025-03-17 NOTE — PATIENT INSTRUCTIONS
Continue current medications  Obtain echocardiogram to reassess heart function  2 g sodium diet  Physical activity/exercise as tolerated

## 2025-03-26 ENCOUNTER — OFFICE VISIT (OUTPATIENT)
Dept: FAMILY MEDICINE CLINIC | Facility: CLINIC | Age: 45
End: 2025-03-26
Payer: MEDICARE

## 2025-03-26 VITALS
WEIGHT: 185 LBS | SYSTOLIC BLOOD PRESSURE: 124 MMHG | BODY MASS INDEX: 30.82 KG/M2 | TEMPERATURE: 97.7 F | RESPIRATION RATE: 18 BRPM | OXYGEN SATURATION: 96 % | DIASTOLIC BLOOD PRESSURE: 61 MMHG | HEIGHT: 65 IN | HEART RATE: 72 BPM

## 2025-03-26 DIAGNOSIS — I10 BENIGN ESSENTIAL HYPERTENSION: ICD-10-CM

## 2025-03-26 DIAGNOSIS — E66.811 CLASS 1 OBESITY DUE TO EXCESS CALORIES WITHOUT SERIOUS COMORBIDITY WITH BODY MASS INDEX (BMI) OF 30.0 TO 30.9 IN ADULT: ICD-10-CM

## 2025-03-26 DIAGNOSIS — E66.09 CLASS 1 OBESITY DUE TO EXCESS CALORIES WITHOUT SERIOUS COMORBIDITY WITH BODY MASS INDEX (BMI) OF 30.0 TO 30.9 IN ADULT: ICD-10-CM

## 2025-03-26 DIAGNOSIS — E11.65 INADEQUATELY CONTROLLED DIABETES MELLITUS (HCC): Primary | ICD-10-CM

## 2025-03-26 DIAGNOSIS — Z12.11 SCREENING FOR COLON CANCER: ICD-10-CM

## 2025-03-26 DIAGNOSIS — E78.2 MIXED HYPERLIPIDEMIA: ICD-10-CM

## 2025-03-26 PROCEDURE — G2211 COMPLEX E/M VISIT ADD ON: HCPCS | Performed by: FAMILY MEDICINE

## 2025-03-26 PROCEDURE — 99214 OFFICE O/P EST MOD 30 MIN: CPT | Performed by: FAMILY MEDICINE

## 2025-03-26 RX ORDER — HYDROXYZINE HYDROCHLORIDE 50 MG/1
TABLET, FILM COATED ORAL
COMMUNITY
Start: 2025-03-13

## 2025-03-26 NOTE — ASSESSMENT & PLAN NOTE
Lab Results   Component Value Date    HGBA1C 6.4 (H) 03/13/2025   Controlled.  His glucose was 117 and his A1c went up to 6.4 now when it was 6.2 in August last year and 6.1 in March last year.  Advised to improve his diet.  Exercise as well.  Continue current therapy.  And recheck labs again in 3 months.  Advised patient to see the ophthalmologist as scheduled.  And to be advised to check his feet himself daily.  Advised patient to have less starches sweets and fats.    Orders:  •  Hemoglobin A1C; Future  •  Comprehensive metabolic panel; Future  •  Albumin / creatinine urine ratio; Future  •  UA w Reflex to Microscopic w Reflex to Culture; Future

## 2025-03-26 NOTE — ASSESSMENT & PLAN NOTE
Controlled.  LFTs normal.  Total cholesterol 157 but his triglycerides went up to 179 from 108 from August of last year, HDL went down to 30 from 39 and his LDL went up to 91 from 90.  Advised patient continue his statin.  Patient reports no side effects.  But also to cut down on his starches to help his triglycerides.  Exercise.  Recheck labs in 3 months.  Orders:  •  Comprehensive metabolic panel; Future  •  Lipid Panel with Direct LDL reflex; Future

## 2025-03-26 NOTE — PROGRESS NOTES
Name: Wes Virgen      : 1980      MRN: [id number]  Encounter Provider: Darshan Burgess MD  Encounter Date: 3/26/2025   Encounter department: [facility]  :  Assessment & Plan  Inadequately controlled diabetes mellitus (HCC)    Lab Results   Component Value Date    HGBA1C 6.4 (H) 2025   Controlled.  His glucose was 117 and his A1c went up to 6.4 now when it was 6.2 in August last year and 6.1 in March last year.  Advised to improve his diet.  Exercise as well.  Continue current therapy.  And recheck labs again in 3 months.  Advised patient to see the ophthalmologist as scheduled.  And to be advised to check his feet himself daily.  Advised patient to have less starches sweets and fats.    Orders:  •  Hemoglobin A1C; Future  •  Comprehensive metabolic panel; Future  •  Albumin / creatinine urine ratio; Future  •  UA w Reflex to Microscopic w Reflex to Culture; Future    Benign essential hypertension  Controlled.  Advised patient to have less salt.  Hydrate well.  Continue current therapy.  Labs reviewed.  Orders:  •  Comprehensive metabolic panel; Future  •  UA w Reflex to Microscopic w Reflex to Culture; Future    Mixed hyperlipidemia  Controlled.  LFTs normal.  Total cholesterol 157 but his triglycerides went up to 179 from 108 from August of last year, HDL went down to 30 from 39 and his LDL went up to 91 from 90.  Advised patient continue his statin.  Patient reports no side effects.  But also to cut down on his starches to help his triglycerides.  Exercise.  Recheck labs in 3 months.  Orders:  •  Comprehensive metabolic panel; Future  •  Lipid Panel with Direct LDL reflex; Future    Class 1 obesity due to excess calories without serious comorbidity with body mass index (BMI) of 30.0 to 30.9 in adult    Obesity with a BMI of 30.79.  Advised patient to lose weight with a better diet and exercise.  Continue to monitor and follow-up.       Screening for  "colon cancer  Discussed with patient.  Patient sent to GI again for his colonoscopy.  Orders:  •  Ambulatory Referral to Gastroenterology; Future        RTO 3 months for his Medicare annual well visit and diabetes follow-up.  Do the blood work and urine 1 week before.  Patient can see me prior to that for anything else in between.         History of Present Illness   44-year-old male here accompanied by his wife for an evaluation of his diabetes, hypertension and hyperlipidemia.  Patient did blood work and urine recently.  Patient has not done his colonoscopy that he is due for.  Patient denies tobacco.  Patient declined vaccines including his Tdap for now.  No history of an adverse reaction to vaccines in the past.      Review of Systems   Constitutional:  Negative for chills, fatigue, fever and unexpected weight change.   HENT:  Negative for congestion and sore throat.    Eyes:  Negative for visual disturbance.   Respiratory:  Negative for cough and shortness of breath.    Cardiovascular:  Negative for chest pain and palpitations.   Gastrointestinal:  Negative for abdominal pain and blood in stool.   Genitourinary:  Negative for dysuria and hematuria.   Neurological:  Negative for dizziness and headaches.   Psychiatric/Behavioral:  Negative for dysphoric mood. The patient is not nervous/anxious.        Objective   /61 (BP Location: Left arm, Patient Position: Sitting, Cuff Size: Large)   Pulse 72   Temp 97.7 °F (36.5 °C) (Temporal)   Resp 18   Ht 5' 5\" (1.651 m)   Wt 83.9 kg (185 lb)   SpO2 96%   BMI 30.79 kg/m²      Physical Exam  Vitals and nursing note reviewed.   Constitutional:       General: He is not in acute distress.     Appearance: Normal appearance. He is obese. He is not ill-appearing.   Neck:      Vascular: No carotid bruit.   Cardiovascular:      Rate and Rhythm: Normal rate and regular rhythm.   Pulmonary:      Effort: Pulmonary effort is normal.      Breath sounds: Normal breath " sounds.   Musculoskeletal:      Right lower leg: No edema.      Left lower leg: No edema.      Comments: No calf tenderness bilateral.   Neurological:      General: No focal deficit present.      Mental Status: He is alert and oriented to person, place, and time.   Psychiatric:         Mood and Affect: Mood normal.         Behavior: Behavior normal.         Thought Content: Thought content normal.

## 2025-03-26 NOTE — ASSESSMENT & PLAN NOTE
Obesity with a BMI of 30.79.  Advised patient to lose weight with a better diet and exercise.  Continue to monitor and follow-up.

## 2025-03-26 NOTE — ASSESSMENT & PLAN NOTE
Controlled.  Advised patient to have less salt.  Hydrate well.  Continue current therapy.  Labs reviewed.  Orders:  •  Comprehensive metabolic panel; Future  •  UA w Reflex to Microscopic w Reflex to Culture; Future

## 2025-03-27 DIAGNOSIS — I42.9 CARDIOMYOPATHY, UNSPECIFIED TYPE (HCC): ICD-10-CM

## 2025-03-28 ENCOUNTER — TELEPHONE (OUTPATIENT)
Dept: OTHER | Facility: HOSPITAL | Age: 45
End: 2025-03-28

## 2025-03-28 RX ORDER — METOPROLOL SUCCINATE 25 MG/1
12.5 TABLET, EXTENDED RELEASE ORAL 2 TIMES DAILY
Qty: 30 TABLET | Refills: 5 | Status: SHIPPED | OUTPATIENT
Start: 2025-03-28

## 2025-03-28 NOTE — TELEPHONE ENCOUNTER
metoprolol succinate (TOPROL-XL) 25 mg 24 hr tablet   30 w 5 refills   Sent to [facility]     Pt to contact  pharmacy

## 2025-04-03 ENCOUNTER — HOSPITAL ENCOUNTER (OUTPATIENT)
Dept: NON INVASIVE DIAGNOSTICS | Facility: CLINIC | Age: 45
Discharge: HOME | End: 2025-04-03
Payer: MEDICARE

## 2025-04-03 VITALS
DIASTOLIC BLOOD PRESSURE: 61 MMHG | BODY MASS INDEX: 30.82 KG/M2 | HEIGHT: 65 IN | WEIGHT: 185 LBS | SYSTOLIC BLOOD PRESSURE: 124 MMHG | HEART RATE: 72 BPM

## 2025-04-03 DIAGNOSIS — I50.32 HEART FAILURE WITH IMPROVED EJECTION FRACTION (HFIMPEF) (HCC): ICD-10-CM

## 2025-04-03 LAB
AORTIC ROOT: 3.6 CM
ASCENDING AORTA: 3.1 CM
BSA FOR ECHO PROCEDURE: 1.91 M2
E WAVE DECELERATION TIME: 225 MS
E/A RATIO: 0.91
FRACTIONAL SHORTENING: 30 (ref 28–44)
INTERVENTRICULAR SEPTUM IN DIASTOLE (PARASTERNAL SHORT AXIS VIEW): 1 CM
INTERVENTRICULAR SEPTUM: 1 CM (ref 0.6–1.1)
LAAS-AP4: 7.1 CM2
LEFT ATRIUM SIZE: 3.3 CM
LEFT INTERNAL DIMENSION IN SYSTOLE: 3.5 CM (ref 2.1–4)
LEFT VENTRICULAR INTERNAL DIMENSION IN DIASTOLE: 5 CM (ref 3.5–6)
LEFT VENTRICULAR POSTERIOR WALL IN END DIASTOLE: 1.2 CM
LEFT VENTRICULAR STROKE VOLUME: 67 ML
LV EF US.2D.A4C+ESTIMATED: 54 %
LVSV (TEICH): 67 ML
MV E'TISSUE VEL-LAT: 16 CM/S
MV E'TISSUE VEL-SEP: 11 CM/S
MV PEAK A VEL: 0.69 M/S
MV PEAK E VEL: 63 CM/S
MV STENOSIS PRESSURE HALF TIME: 65 MS
MV VALVE AREA P 1/2 METHOD: 3.38
RIGHT ATRIUM AREA SYSTOLE A4C: 10.2 CM2
RIGHT VENTRICLE ID DIMENSION: 2.9 CM
SINOTUBULAR JUNCTION: 3 CM
SL CV LV EF: 55
SL CV PED ECHO LEFT VENTRICLE DIASTOLIC VOLUME (MOD BIPLANE) 2D: 118 ML
SL CV PED ECHO LEFT VENTRICLE SYSTOLIC VOLUME (MOD BIPLANE) 2D: 51 ML
SL CV SINUS OF VALSALVA 2D: 3.7 CM
STJ: 3 CM
TRICUSPID ANNULAR PLANE SYSTOLIC EXCURSION: 2.1 CM

## 2025-04-03 PROCEDURE — 93306 TTE W/DOPPLER COMPLETE: CPT | Performed by: INTERNAL MEDICINE

## 2025-04-03 PROCEDURE — 93306 TTE W/DOPPLER COMPLETE: CPT

## 2025-04-04 ENCOUNTER — RESULTS FOLLOW-UP (OUTPATIENT)
Dept: CARDIOLOGY CLINIC | Facility: CLINIC | Age: 45
End: 2025-04-04

## 2025-05-01 DIAGNOSIS — I42.9 CARDIOMYOPATHY, UNSPECIFIED TYPE (HCC): ICD-10-CM

## 2025-05-02 RX ORDER — SACUBITRIL AND VALSARTAN 24; 26 MG/1; MG/1
TABLET, FILM COATED ORAL
Qty: 180 TABLET | Refills: 1 | Status: SHIPPED | OUTPATIENT
Start: 2025-05-02

## 2025-05-07 ENCOUNTER — TELEPHONE (OUTPATIENT)
Age: 45
End: 2025-05-07

## 2025-06-23 DIAGNOSIS — I42.9 CARDIOMYOPATHY, UNSPECIFIED TYPE (HCC): ICD-10-CM

## 2025-06-24 RX ORDER — EMPAGLIFLOZIN 10 MG/1
10 TABLET, FILM COATED ORAL EVERY MORNING
Qty: 90 TABLET | Refills: 1 | Status: SHIPPED | OUTPATIENT
Start: 2025-06-24

## 2025-07-21 ENCOUNTER — APPOINTMENT (OUTPATIENT)
Dept: LAB | Facility: CLINIC | Age: 45
End: 2025-07-21
Payer: MEDICARE

## 2025-07-21 DIAGNOSIS — I10 BENIGN ESSENTIAL HYPERTENSION: ICD-10-CM

## 2025-07-21 DIAGNOSIS — E11.65 INADEQUATELY CONTROLLED DIABETES MELLITUS (HCC): ICD-10-CM

## 2025-07-21 DIAGNOSIS — E78.2 MIXED HYPERLIPIDEMIA: ICD-10-CM

## 2025-07-21 LAB
ALBUMIN SERPL BCG-MCNC: 4.1 G/DL (ref 3.5–5)
ALP SERPL-CCNC: 88 U/L (ref 34–104)
ALT SERPL W P-5'-P-CCNC: 19 U/L (ref 7–52)
ANION GAP SERPL CALCULATED.3IONS-SCNC: 9 MMOL/L (ref 4–13)
AST SERPL W P-5'-P-CCNC: 18 U/L (ref 13–39)
BACTERIA UR QL AUTO: NORMAL /HPF
BILIRUB SERPL-MCNC: 0.51 MG/DL (ref 0.2–1)
BILIRUB UR QL STRIP: NEGATIVE
BUN SERPL-MCNC: 14 MG/DL (ref 5–25)
CALCIUM SERPL-MCNC: 9.1 MG/DL (ref 8.4–10.2)
CHLORIDE SERPL-SCNC: 105 MMOL/L (ref 96–108)
CHOLEST SERPL-MCNC: 84 MG/DL (ref ?–200)
CLARITY UR: CLEAR
CO2 SERPL-SCNC: 25 MMOL/L (ref 21–32)
COLOR UR: YELLOW
CREAT SERPL-MCNC: 0.77 MG/DL (ref 0.6–1.3)
CREAT UR-MCNC: 99.2 MG/DL
EST. AVERAGE GLUCOSE BLD GHB EST-MCNC: 137 MG/DL
GFR SERPL CREATININE-BSD FRML MDRD: 108 ML/MIN/1.73SQ M
GLUCOSE P FAST SERPL-MCNC: 114 MG/DL (ref 65–99)
GLUCOSE UR STRIP-MCNC: ABNORMAL MG/DL
HBA1C MFR BLD: 6.4 %
HDLC SERPL-MCNC: 29 MG/DL
HGB UR QL STRIP.AUTO: NEGATIVE
KETONES UR STRIP-MCNC: NEGATIVE MG/DL
LDLC SERPL CALC-MCNC: 39 MG/DL (ref 0–100)
LEUKOCYTE ESTERASE UR QL STRIP: ABNORMAL
MICROALBUMIN UR-MCNC: <7 MG/L
NITRITE UR QL STRIP: NEGATIVE
NON-SQ EPI CELLS URNS QL MICRO: NORMAL /HPF
PH UR STRIP.AUTO: 6 [PH]
POTASSIUM SERPL-SCNC: 4.2 MMOL/L (ref 3.5–5.3)
PROT SERPL-MCNC: 7.3 G/DL (ref 6.4–8.4)
PROT UR STRIP-MCNC: NEGATIVE MG/DL
RBC #/AREA URNS AUTO: NORMAL /HPF
SODIUM SERPL-SCNC: 139 MMOL/L (ref 135–147)
SP GR UR STRIP.AUTO: 1.02 (ref 1–1.03)
TRIGL SERPL-MCNC: 79 MG/DL (ref ?–150)
UROBILINOGEN UR STRIP-ACNC: <2 MG/DL
WBC #/AREA URNS AUTO: NORMAL /HPF

## 2025-07-21 PROCEDURE — 36415 COLL VENOUS BLD VENIPUNCTURE: CPT

## 2025-07-21 PROCEDURE — 81001 URINALYSIS AUTO W/SCOPE: CPT

## 2025-07-21 PROCEDURE — 80053 COMPREHEN METABOLIC PANEL: CPT

## 2025-07-21 PROCEDURE — 82043 UR ALBUMIN QUANTITATIVE: CPT

## 2025-07-21 PROCEDURE — 82570 ASSAY OF URINE CREATININE: CPT

## 2025-07-21 PROCEDURE — 83036 HEMOGLOBIN GLYCOSYLATED A1C: CPT

## 2025-07-21 PROCEDURE — 80061 LIPID PANEL: CPT

## 2025-07-29 ENCOUNTER — OFFICE VISIT (OUTPATIENT)
Dept: FAMILY MEDICINE CLINIC | Facility: CLINIC | Age: 45
End: 2025-07-29
Payer: MEDICARE

## 2025-07-29 VITALS
HEART RATE: 71 BPM | RESPIRATION RATE: 16 BRPM | SYSTOLIC BLOOD PRESSURE: 100 MMHG | HEIGHT: 65 IN | TEMPERATURE: 97.7 F | BODY MASS INDEX: 30.09 KG/M2 | OXYGEN SATURATION: 97 % | DIASTOLIC BLOOD PRESSURE: 60 MMHG | WEIGHT: 180.6 LBS

## 2025-07-29 DIAGNOSIS — I10 BENIGN ESSENTIAL HYPERTENSION: ICD-10-CM

## 2025-07-29 DIAGNOSIS — F51.01 PRIMARY INSOMNIA: ICD-10-CM

## 2025-07-29 DIAGNOSIS — F41.9 ANXIETY: ICD-10-CM

## 2025-07-29 DIAGNOSIS — E11.65 TYPE 2 DIABETES MELLITUS WITH HYPERGLYCEMIA, WITHOUT LONG-TERM CURRENT USE OF INSULIN (HCC): ICD-10-CM

## 2025-07-29 DIAGNOSIS — E78.2 MIXED HYPERLIPIDEMIA: ICD-10-CM

## 2025-07-29 DIAGNOSIS — E66.09 CLASS 1 OBESITY DUE TO EXCESS CALORIES WITHOUT SERIOUS COMORBIDITY WITH BODY MASS INDEX (BMI) OF 30.0 TO 30.9 IN ADULT: ICD-10-CM

## 2025-07-29 DIAGNOSIS — Z12.11 SCREENING FOR COLON CANCER: ICD-10-CM

## 2025-07-29 DIAGNOSIS — R35.1 NOCTURIA: ICD-10-CM

## 2025-07-29 DIAGNOSIS — E66.811 CLASS 1 OBESITY DUE TO EXCESS CALORIES WITHOUT SERIOUS COMORBIDITY WITH BODY MASS INDEX (BMI) OF 30.0 TO 30.9 IN ADULT: ICD-10-CM

## 2025-07-29 DIAGNOSIS — I50.32 HEART FAILURE WITH IMPROVED EJECTION FRACTION (HFIMPEF) (HCC): ICD-10-CM

## 2025-07-29 DIAGNOSIS — Z00.00 MEDICARE ANNUAL WELLNESS VISIT, SUBSEQUENT: Primary | ICD-10-CM

## 2025-07-29 DIAGNOSIS — F31.70 BIPOLAR AFFECTIVE DISORDER IN REMISSION (HCC): ICD-10-CM

## 2025-07-29 PROCEDURE — G2211 COMPLEX E/M VISIT ADD ON: HCPCS | Performed by: FAMILY MEDICINE

## 2025-07-29 PROCEDURE — 99214 OFFICE O/P EST MOD 30 MIN: CPT | Performed by: FAMILY MEDICINE

## 2025-07-29 PROCEDURE — G0439 PPPS, SUBSEQ VISIT: HCPCS | Performed by: FAMILY MEDICINE

## 2025-07-29 RX ORDER — GABAPENTIN 100 MG/1
100 CAPSULE ORAL 3 TIMES DAILY
Qty: 90 CAPSULE | Refills: 2 | Status: SHIPPED | OUTPATIENT
Start: 2025-07-29

## 2025-07-29 RX ORDER — HYDROXYZINE HYDROCHLORIDE 50 MG/1
50 TABLET, FILM COATED ORAL
Qty: 30 TABLET | Refills: 2 | Status: SHIPPED | OUTPATIENT
Start: 2025-07-29

## 2025-08-14 ENCOUNTER — TELEPHONE (OUTPATIENT)
Age: 45
End: 2025-08-14